# Patient Record
Sex: FEMALE | Race: WHITE | NOT HISPANIC OR LATINO | Employment: UNEMPLOYED | URBAN - NONMETROPOLITAN AREA
[De-identification: names, ages, dates, MRNs, and addresses within clinical notes are randomized per-mention and may not be internally consistent; named-entity substitution may affect disease eponyms.]

---

## 2017-02-14 DIAGNOSIS — I10 ESSENTIAL HYPERTENSION: ICD-10-CM

## 2017-02-14 RX ORDER — LISINOPRIL 2.5 MG/1
TABLET ORAL
Qty: 30 TABLET | Refills: 5 | Status: SHIPPED | OUTPATIENT
Start: 2017-02-14

## 2018-06-25 ENCOUNTER — TELEPHONE (OUTPATIENT)
Dept: VASCULAR SURGERY | Facility: CLINIC | Age: 53
End: 2018-06-25

## 2018-06-28 ENCOUNTER — OFFICE VISIT (OUTPATIENT)
Dept: VASCULAR SURGERY | Facility: CLINIC | Age: 53
End: 2018-06-28

## 2018-06-28 VITALS
SYSTOLIC BLOOD PRESSURE: 132 MMHG | DIASTOLIC BLOOD PRESSURE: 77 MMHG | OXYGEN SATURATION: 94 % | WEIGHT: 112 LBS | BODY MASS INDEX: 19.84 KG/M2 | HEART RATE: 76 BPM | HEIGHT: 63 IN

## 2018-06-28 DIAGNOSIS — I10 ESSENTIAL HYPERTENSION: ICD-10-CM

## 2018-06-28 DIAGNOSIS — Z72.0 TOBACCO ABUSE: ICD-10-CM

## 2018-06-28 DIAGNOSIS — I65.23 BILATERAL CAROTID ARTERY STENOSIS: Primary | ICD-10-CM

## 2018-06-28 PROCEDURE — 99203 OFFICE O/P NEW LOW 30 MIN: CPT | Performed by: NURSE PRACTITIONER

## 2018-06-28 RX ORDER — LISINOPRIL 2.5 MG/1
2.5 TABLET ORAL DAILY
Status: ON HOLD | COMMUNITY
End: 2019-06-30 | Stop reason: SDDI

## 2018-06-28 RX ORDER — ASPIRIN 325 MG
325 TABLET ORAL DAILY
COMMUNITY

## 2018-06-28 NOTE — PROGRESS NOTES
06/28/2018      Hernan Ybarra MD  [address] [facility] [address] [place] [address]    Jud Dhillon  1965    Chief Complaint   Patient presents with   • Establish Care     Patient is here to establish care. Pain in neck, dizziness and noise in ears. Patient denies any stroke like symptoms       Dear Hernan Ybarra MD:      HPI  I had the pleasure of seeing your patient Jud Dhillon in the office today.  Thank you kindly for this consultation.  As you recall, Jud Dhillon is a 53 y.o.  female who you are currently following for routine health maintenance.  She has complaints of neck pain, dizziness, and noise in her ears.  She denies any strokelike symptoms.  She did have noninvasive testing performed, which I did review in office.    Past Medical History:   Diagnosis Date   • Asthma    • Carotid artery disease    • COPD (chronic obstructive pulmonary disease)    • Dizziness    • Hypertension    • Other kyphosis, thoracic region    • Pneumonia    • Small bowel carcinoma     History of small bowel carcinoma and thyroid cancer   • Tinnitus    • Tobacco abuse        Past Surgical History:   Procedure Laterality Date   • APPENDECTOMY     • CHOLECYSTECTOMY         Family History   Problem Relation Age of Onset   • Cancer Mother         Breast Cancer, Liver Cancer   • Cancer Father         Lung Cancer, Brain Mets   • Cancer Maternal Uncle         Pancreatic Cancer   • Cancer Paternal Uncle         Lung Cancer   • Heart attack Maternal Grandmother    • Cancer Paternal Grandfather         Throat Cancer   • Stroke Neg Hx    • Aneurysm Neg Hx        Social History     Social History   • Marital status:      Spouse name: N/A   • Number of children: N/A   • Years of education: N/A     Occupational History   • Not on file.     Social History Main Topics   • Smoking status: Current Every Day Smoker     Packs/day: 1.50     Years: 45.00   • Smokeless tobacco: Never Used   • Alcohol use No   • Drug use: No   • Sexual activity:  Defer     Other Topics Concern   • Not on file     Social History Narrative   • No narrative on file       Allergies   Allergen Reactions   • Avelox [Moxifloxacin Hcl] Unknown (See Comments)     unknown   • Codeine Unknown (See Comments)     unknown   • Lortab [Hydrocodone-Acetaminophen] Unknown (See Comments)     Unknown   • Robaxin [Methocarbamol] Unknown (See Comments)     unknown       Prior to Admission medications    Medication Sig Start Date End Date Taking? Authorizing Provider   albuterol (PROVENTIL) (2.5 MG/3ML) 0.083% nebulizer solution  6/14/18  Yes Historical Provider, MD   aspirin 81 MG tablet Take 81 mg by mouth.   Yes Historical Provider, MD   atorvastatin (LIPITOR) 40 MG tablet  6/14/18  Yes Historical Provider, MD   baclofen (LIORESAL) 10 MG tablet  6/14/18  Yes Historical Provider, MD   escitalopram (LEXAPRO) 20 MG tablet  6/14/18  Yes Historical Provider, MD   furosemide (LASIX) 20 MG tablet  6/14/18  Yes Historical Provider, MD   lisinopril (PRINIVIL,ZESTRIL) 2.5 MG tablet Take 2.5 mg by mouth Daily.   Yes Historical Provider, MD   meclizine (ANTIVERT) 25 MG tablet  6/14/18  Yes Historical Provider, MD   metoprolol succinate XL (TOPROL-XL) 25 MG 24 hr tablet  6/14/18  Yes Historical Provider, MD   Multiple Vitamins-Minerals (SENTRY PO)  6/14/18  Yes Historical Provider, MD   ondansetron ODT (ZOFRAN-ODT) 4 MG disintegrating tablet  5/14/18  Yes Historical Provider, MD   promethazine (PHENERGAN) 25 MG tablet  6/18/18  Yes Historical Provider, MD   QUEtiapine (SEROquel) 50 MG tablet  6/14/18  Yes Historical Provider, MD   raNITIdine (ZANTAC) 150 MG tablet  6/14/18  Yes Historical Provider, MD   VENTOLIN  (90 Base) MCG/ACT inhaler  6/14/18  Yes Historical Provider, MD   triamterene-hydrochlorothiazide (MAXZIDE-25) 37.5-25 MG per tablet  6/14/18 6/28/18  Historical Provider, MD       Review of Systems   Constitutional: Negative.    HENT: Negative.    Eyes: Negative.    Respiratory: Negative.   "  Cardiovascular: Negative.    Gastrointestinal: Negative.    Endocrine: Negative.    Genitourinary: Negative.    Musculoskeletal: Positive for neck pain.   Skin: Negative.    Allergic/Immunologic: Negative.    Neurological: Positive for dizziness.   Hematological: Negative.    Psychiatric/Behavioral: Negative.        /77 (BP Location: Right arm, Patient Position: Sitting, Cuff Size: Adult)   Pulse 76   Ht 160 cm (63\")   Wt 50.8 kg (112 lb)   SpO2 94%   Breastfeeding? No   BMI 19.84 kg/m²   Physical Exam   Constitutional: She is oriented to person, place, and time. She appears well-developed and well-nourished. No distress.   HENT:   Head: Normocephalic and atraumatic.   Mouth/Throat: Oropharynx is clear and moist.   Eyes: Pupils are equal, round, and reactive to light. No scleral icterus.   Neck: Neck supple. No JVD present. Carotid bruit is not present. Decreased range of motion present. No thyromegaly present.   kyphosis   Cardiovascular: Normal rate, regular rhythm, S2 normal, normal heart sounds, intact distal pulses and normal pulses.  Exam reveals no gallop and no friction rub.    No murmur heard.  Pulmonary/Chest: Effort normal and breath sounds normal.   Abdominal: Soft. Normal aorta and bowel sounds are normal. There is no hepatosplenomegaly.   Neurological: She is alert and oriented to person, place, and time. No cranial nerve deficit.   Skin: Skin is warm and dry. She is not diaphoretic.   Psychiatric: She has a normal mood and affect. Her behavior is normal. Judgment and thought content normal.   Nursing note and vitals reviewed.      No results found.    Patient Active Problem List   Diagnosis   • CAD (coronary artery disease)   • Tobacco abuse   • Hypertension   • Other kyphosis, thoracic region   • Dizziness         ICD-10-CM ICD-9-CM   1. Bilateral carotid artery stenosis I65.23 433.10     433.30   2. Essential hypertension I10 401.9   3. Tobacco abuse Z72.0 305.1       Plan: After " thoroughly evaluating Jud Dhillon, I believe the best course of action is to remain conservative from a vascular standpoint.  We will see Jud Dhillon back in 1 year with repeat noninvasive testing for continued surveillance, including a carotid duplex.  She does not have any significant stenosis, but tortuosity of vessels per report.  I did discuss vascular risk factors as they pertain to the progression of vascular disease including controlling her hypertension and smoking cessation.  I advised Jud of the risks of continuing to use tobacco, and I provided her with tobacco cessation educational materials in the After Visit Summary. During this visit, I spent 10 minutes counseling the patient regarding tobacco cessation. The patient can continue taking her current medication regimen as previously planned.  This was all discussed in full with complete understanding.    Thank you for allowing me to participate in the care of your patient.  Please do not hesitate with any questions or concerns.  I will keep you aware of any further encounters with Jud Dhillon.        Sincerely yours,         RONAN Lee MD

## 2018-10-17 ENCOUNTER — APPOINTMENT (OUTPATIENT)
Dept: GENERAL RADIOLOGY | Age: 53
End: 2018-10-17
Payer: MEDICAID

## 2018-10-17 ENCOUNTER — HOSPITAL ENCOUNTER (EMERGENCY)
Age: 53
Discharge: HOME | End: 2018-10-17
Payer: MEDICAID

## 2018-10-17 VITALS
HEART RATE: 65 BPM | WEIGHT: 110 LBS | OXYGEN SATURATION: 96 % | TEMPERATURE: 98.4 F | DIASTOLIC BLOOD PRESSURE: 73 MMHG | SYSTOLIC BLOOD PRESSURE: 119 MMHG | BODY MASS INDEX: 19.49 KG/M2 | HEIGHT: 63 IN | RESPIRATION RATE: 16 BRPM

## 2018-10-17 DIAGNOSIS — S52.502D CLOSED FRACTURE OF DISTAL ENDS OF LEFT RADIUS AND ULNA WITH ROUTINE HEALING, SUBSEQUENT ENCOUNTER: Primary | ICD-10-CM

## 2018-10-17 DIAGNOSIS — S52.602D CLOSED FRACTURE OF DISTAL ENDS OF LEFT RADIUS AND ULNA WITH ROUTINE HEALING, SUBSEQUENT ENCOUNTER: Primary | ICD-10-CM

## 2018-10-17 PROCEDURE — 6370000000 HC RX 637 (ALT 250 FOR IP): Performed by: NURSE PRACTITIONER

## 2018-10-17 PROCEDURE — 73110 X-RAY EXAM OF WRIST: CPT

## 2018-10-17 PROCEDURE — 99283 EMERGENCY DEPT VISIT LOW MDM: CPT

## 2018-10-17 PROCEDURE — 73080 X-RAY EXAM OF ELBOW: CPT

## 2018-10-17 PROCEDURE — 99284 EMERGENCY DEPT VISIT MOD MDM: CPT | Performed by: NURSE PRACTITIONER

## 2018-10-17 PROCEDURE — 73090 X-RAY EXAM OF FOREARM: CPT

## 2018-10-17 RX ORDER — OXYCODONE HYDROCHLORIDE AND ACETAMINOPHEN 5; 325 MG/1; MG/1
1 TABLET ORAL ONCE
Status: COMPLETED | OUTPATIENT
Start: 2018-10-17 | End: 2018-10-17

## 2018-10-17 RX ORDER — IBUPROFEN 800 MG/1
800 TABLET ORAL EVERY 8 HOURS PRN
COMMUNITY

## 2018-10-17 RX ADMIN — OXYCODONE AND ACETAMINOPHEN 1 TABLET: 5; 325 TABLET ORAL at 13:54

## 2018-10-17 ASSESSMENT — PAIN SCALES - GENERAL
PAINLEVEL_OUTOF10: 7
PAINLEVEL_OUTOF10: 7

## 2018-10-17 ASSESSMENT — PAIN DESCRIPTION - ORIENTATION: ORIENTATION: LEFT

## 2018-10-17 NOTE — ED NOTES
Bed: RME03  Expected date:   Expected time:   Means of arrival:   Comments:     Aris Enamorado, [address] [facility]  10/17/18 1127

## 2018-10-18 ENCOUNTER — ANESTHESIA EVENT (OUTPATIENT)
Dept: OPERATING ROOM | Age: 53
End: 2018-10-18
Payer: MEDICAID

## 2018-10-18 ENCOUNTER — HOSPITAL ENCOUNTER (OUTPATIENT)
Age: 53
Setting detail: OUTPATIENT SURGERY
Discharge: HOME | End: 2018-10-18
Attending: ORTHOPAEDIC SURGERY | Admitting: ORTHOPAEDIC SURGERY
Payer: MEDICAID

## 2018-10-18 ENCOUNTER — APPOINTMENT (OUTPATIENT)
Dept: GENERAL RADIOLOGY | Age: 53
End: 2018-10-18
Attending: ORTHOPAEDIC SURGERY
Payer: MEDICAID

## 2018-10-18 ENCOUNTER — ANESTHESIA (OUTPATIENT)
Dept: OPERATING ROOM | Age: 53
End: 2018-10-18
Payer: MEDICAID

## 2018-10-18 VITALS — SYSTOLIC BLOOD PRESSURE: 94 MMHG | DIASTOLIC BLOOD PRESSURE: 52 MMHG | OXYGEN SATURATION: 100 %

## 2018-10-18 VITALS
HEIGHT: 63 IN | DIASTOLIC BLOOD PRESSURE: 76 MMHG | WEIGHT: 110 LBS | RESPIRATION RATE: 14 BRPM | TEMPERATURE: 98.4 F | HEART RATE: 81 BPM | BODY MASS INDEX: 19.49 KG/M2 | SYSTOLIC BLOOD PRESSURE: 141 MMHG | OXYGEN SATURATION: 95 %

## 2018-10-18 DIAGNOSIS — S52.532A CLOSED COLLES' FRACTURE OF LEFT RADIUS, INITIAL ENCOUNTER: Primary | ICD-10-CM

## 2018-10-18 LAB
ANION GAP SERPL CALCULATED.3IONS-SCNC: 13 MMOL/L (ref 7–19)
BUN BLDV-MCNC: 17 MG/DL (ref 6–20)
CALCIUM SERPL-MCNC: 8.6 MG/DL (ref 8.6–10)
CHLORIDE BLD-SCNC: 106 MMOL/L (ref 98–111)
CO2: 22 MMOL/L (ref 22–29)
CREAT SERPL-MCNC: 0.5 MG/DL (ref 0.5–0.9)
GFR NON-AFRICAN AMERICAN: >60
GLUCOSE BLD-MCNC: 92 MG/DL (ref 74–109)
HCT VFR BLD CALC: 35 % (ref 37–47)
HEMOGLOBIN: 10.5 G/DL (ref 12–16)
MCH RBC QN AUTO: 26.3 PG (ref 27–31)
MCHC RBC AUTO-ENTMCNC: 30 G/DL (ref 33–37)
MCV RBC AUTO: 87.7 FL (ref 81–99)
PDW BLD-RTO: 17.6 % (ref 11.5–14.5)
PLATELET # BLD: 278 K/UL (ref 130–400)
PMV BLD AUTO: 10.3 FL (ref 9.4–12.3)
POTASSIUM SERPL-SCNC: 4.4 MMOL/L (ref 3.5–4.9)
RBC # BLD: 3.99 M/UL (ref 4.2–5.4)
SODIUM BLD-SCNC: 141 MMOL/L (ref 136–145)
WBC # BLD: 15.2 K/UL (ref 4.8–10.8)

## 2018-10-18 PROCEDURE — 2580000003 HC RX 258: Performed by: ANESTHESIOLOGY

## 2018-10-18 PROCEDURE — 2709999900 HC NON-CHARGEABLE SUPPLY: Performed by: ORTHOPAEDIC SURGERY

## 2018-10-18 PROCEDURE — 2500000003 HC RX 250 WO HCPCS: Performed by: ANESTHESIOLOGY

## 2018-10-18 PROCEDURE — 6360000002 HC RX W HCPCS: Performed by: ANESTHESIOLOGY

## 2018-10-18 PROCEDURE — 7100000011 HC PHASE II RECOVERY - ADDTL 15 MIN: Performed by: ORTHOPAEDIC SURGERY

## 2018-10-18 PROCEDURE — 64415 NJX AA&/STRD BRCH PLXS IMG: CPT | Performed by: NURSE ANESTHETIST, CERTIFIED REGISTERED

## 2018-10-18 PROCEDURE — 3600000014 HC SURGERY LEVEL 4 ADDTL 15MIN: Performed by: ORTHOPAEDIC SURGERY

## 2018-10-18 PROCEDURE — 93005 ELECTROCARDIOGRAM TRACING: CPT

## 2018-10-18 PROCEDURE — 73100 X-RAY EXAM OF WRIST: CPT

## 2018-10-18 PROCEDURE — C1713 ANCHOR/SCREW BN/BN,TIS/BN: HCPCS | Performed by: ORTHOPAEDIC SURGERY

## 2018-10-18 PROCEDURE — 3209999900 FLUORO FOR SURGICAL PROCEDURES

## 2018-10-18 PROCEDURE — 3700000000 HC ANESTHESIA ATTENDED CARE: Performed by: ORTHOPAEDIC SURGERY

## 2018-10-18 PROCEDURE — 6360000002 HC RX W HCPCS: Performed by: NURSE ANESTHETIST, CERTIFIED REGISTERED

## 2018-10-18 PROCEDURE — 80048 BASIC METABOLIC PNL TOTAL CA: CPT

## 2018-10-18 PROCEDURE — 3700000001 HC ADD 15 MINUTES (ANESTHESIA): Performed by: ORTHOPAEDIC SURGERY

## 2018-10-18 PROCEDURE — 7100000001 HC PACU RECOVERY - ADDTL 15 MIN: Performed by: ORTHOPAEDIC SURGERY

## 2018-10-18 PROCEDURE — 85027 COMPLETE CBC AUTOMATED: CPT

## 2018-10-18 PROCEDURE — 2720000010 HC SURG SUPPLY STERILE: Performed by: ORTHOPAEDIC SURGERY

## 2018-10-18 PROCEDURE — 3600000004 HC SURGERY LEVEL 4 BASE: Performed by: ORTHOPAEDIC SURGERY

## 2018-10-18 PROCEDURE — 2580000003 HC RX 258: Performed by: ORTHOPAEDIC SURGERY

## 2018-10-18 PROCEDURE — 6360000002 HC RX W HCPCS: Performed by: ORTHOPAEDIC SURGERY

## 2018-10-18 PROCEDURE — 7100000000 HC PACU RECOVERY - FIRST 15 MIN: Performed by: ORTHOPAEDIC SURGERY

## 2018-10-18 PROCEDURE — 36415 COLL VENOUS BLD VENIPUNCTURE: CPT

## 2018-10-18 PROCEDURE — 7100000010 HC PHASE II RECOVERY - FIRST 15 MIN: Performed by: ORTHOPAEDIC SURGERY

## 2018-10-18 DEVICE — SCREW BNE L16MM DIA2.7MM DST VOLAR RAD NONLOCKING FULL THRD: Type: IMPLANTABLE DEVICE | Site: WRIST | Status: FUNCTIONAL

## 2018-10-18 DEVICE — SCREW BNE FIX L18MM DIA2.2MM DST VOLAR RAD SMOOTH LOK PEG: Type: IMPLANTABLE DEVICE | Site: WRIST | Status: FUNCTIONAL

## 2018-10-18 DEVICE — PEG BONE FXTN L20MM D2.2MM DST VOLAR RDL SMOOTH LOK CRSSLCK: Type: IMPLANTABLE DEVICE | Site: WRIST | Status: FUNCTIONAL

## 2018-10-18 DEVICE — SCREW BNE L16MM DIA2.7MM DST RAD LOK FULL THRD SQ DRV HD LO: Type: IMPLANTABLE DEVICE | Site: WRIST | Status: FUNCTIONAL

## 2018-10-18 DEVICE — SCREW BONE L15MM DIA2.7MM DSTL RAD LCK FULL THRD SQ DRV HD: Type: IMPLANTABLE DEVICE | Site: WRIST | Status: FUNCTIONAL

## 2018-10-18 DEVICE — PLATE BNE W24XL51MM 12 H L DST RAD TI LOK COMPR LO PROF NAR: Type: IMPLANTABLE DEVICE | Site: WRIST | Status: FUNCTIONAL

## 2018-10-18 RX ORDER — ENALAPRILAT 2.5 MG/2ML
1.25 INJECTION INTRAVENOUS
Status: DISCONTINUED | OUTPATIENT
Start: 2018-10-18 | End: 2018-10-18 | Stop reason: HOSPADM

## 2018-10-18 RX ORDER — ROPIVACAINE HYDROCHLORIDE 5 MG/ML
INJECTION, SOLUTION EPIDURAL; INFILTRATION; PERINEURAL PRN
Status: DISCONTINUED | OUTPATIENT
Start: 2018-10-18 | End: 2018-10-18 | Stop reason: SDUPTHER

## 2018-10-18 RX ORDER — PROPOFOL 10 MG/ML
INJECTION, EMULSION INTRAVENOUS CONTINUOUS PRN
Status: DISCONTINUED | OUTPATIENT
Start: 2018-10-18 | End: 2018-10-18 | Stop reason: SDUPTHER

## 2018-10-18 RX ORDER — MEPERIDINE HYDROCHLORIDE 50 MG/ML
12.5 INJECTION INTRAMUSCULAR; INTRAVENOUS; SUBCUTANEOUS EVERY 5 MIN PRN
Status: DISCONTINUED | OUTPATIENT
Start: 2018-10-18 | End: 2018-10-18 | Stop reason: HOSPADM

## 2018-10-18 RX ORDER — HYDRALAZINE HYDROCHLORIDE 20 MG/ML
5 INJECTION INTRAMUSCULAR; INTRAVENOUS EVERY 10 MIN PRN
Status: DISCONTINUED | OUTPATIENT
Start: 2018-10-18 | End: 2018-10-18 | Stop reason: HOSPADM

## 2018-10-18 RX ORDER — SODIUM CHLORIDE, SODIUM LACTATE, POTASSIUM CHLORIDE, CALCIUM CHLORIDE 600; 310; 30; 20 MG/100ML; MG/100ML; MG/100ML; MG/100ML
INJECTION, SOLUTION INTRAVENOUS CONTINUOUS
Status: DISCONTINUED | OUTPATIENT
Start: 2018-10-18 | End: 2018-10-18 | Stop reason: HOSPADM

## 2018-10-18 RX ORDER — FENTANYL CITRATE 50 UG/ML
25 INJECTION, SOLUTION INTRAMUSCULAR; INTRAVENOUS
Status: DISCONTINUED | OUTPATIENT
Start: 2018-10-18 | End: 2018-10-18 | Stop reason: HOSPADM

## 2018-10-18 RX ORDER — SODIUM CHLORIDE 0.9 % (FLUSH) 0.9 %
10 SYRINGE (ML) INJECTION PRN
Status: DISCONTINUED | OUTPATIENT
Start: 2018-10-18 | End: 2018-10-18 | Stop reason: HOSPADM

## 2018-10-18 RX ORDER — METOCLOPRAMIDE HYDROCHLORIDE 5 MG/ML
10 INJECTION INTRAMUSCULAR; INTRAVENOUS
Status: DISCONTINUED | OUTPATIENT
Start: 2018-10-18 | End: 2018-10-18 | Stop reason: HOSPADM

## 2018-10-18 RX ORDER — FENTANYL CITRATE 50 UG/ML
50 INJECTION, SOLUTION INTRAMUSCULAR; INTRAVENOUS
Status: DISCONTINUED | OUTPATIENT
Start: 2018-10-18 | End: 2018-10-18 | Stop reason: HOSPADM

## 2018-10-18 RX ORDER — LABETALOL HYDROCHLORIDE 5 MG/ML
5 INJECTION, SOLUTION INTRAVENOUS EVERY 10 MIN PRN
Status: DISCONTINUED | OUTPATIENT
Start: 2018-10-18 | End: 2018-10-18 | Stop reason: HOSPADM

## 2018-10-18 RX ORDER — OXYCODONE HYDROCHLORIDE AND ACETAMINOPHEN 5; 325 MG/1; MG/1
1 TABLET ORAL EVERY 4 HOURS PRN
Qty: 40 TABLET | Refills: 0 | Status: SHIPPED | OUTPATIENT
Start: 2018-10-18 | End: 2018-10-21

## 2018-10-18 RX ORDER — SODIUM CHLORIDE 0.9 % (FLUSH) 0.9 %
10 SYRINGE (ML) INJECTION EVERY 12 HOURS SCHEDULED
Status: DISCONTINUED | OUTPATIENT
Start: 2018-10-18 | End: 2018-10-18 | Stop reason: HOSPADM

## 2018-10-18 RX ORDER — SODIUM CHLORIDE 9 MG/ML
INJECTION, SOLUTION INTRAVENOUS CONTINUOUS
Status: DISCONTINUED | OUTPATIENT
Start: 2018-10-18 | End: 2018-10-18 | Stop reason: HOSPADM

## 2018-10-18 RX ORDER — ASPIRIN 325 MG
325 TABLET ORAL DAILY
COMMUNITY

## 2018-10-18 RX ORDER — LIDOCAINE HYDROCHLORIDE 20 MG/ML
INJECTION, SOLUTION INFILTRATION; PERINEURAL PRN
Status: DISCONTINUED | OUTPATIENT
Start: 2018-10-18 | End: 2018-10-18 | Stop reason: SDUPTHER

## 2018-10-18 RX ORDER — MORPHINE SULFATE/0.9% NACL/PF 1 MG/ML
2 SYRINGE (ML) INJECTION EVERY 5 MIN PRN
Status: DISCONTINUED | OUTPATIENT
Start: 2018-10-18 | End: 2018-10-18 | Stop reason: HOSPADM

## 2018-10-18 RX ORDER — MIDAZOLAM HYDROCHLORIDE 1 MG/ML
INJECTION INTRAMUSCULAR; INTRAVENOUS PRN
Status: DISCONTINUED | OUTPATIENT
Start: 2018-10-18 | End: 2018-10-18 | Stop reason: SDUPTHER

## 2018-10-18 RX ORDER — MIDAZOLAM HYDROCHLORIDE 1 MG/ML
2 INJECTION INTRAMUSCULAR; INTRAVENOUS
Status: COMPLETED | OUTPATIENT
Start: 2018-10-18 | End: 2018-10-18

## 2018-10-18 RX ORDER — LIDOCAINE HYDROCHLORIDE 10 MG/ML
1 INJECTION, SOLUTION EPIDURAL; INFILTRATION; INTRACAUDAL; PERINEURAL
Status: DISCONTINUED | OUTPATIENT
Start: 2018-10-18 | End: 2018-10-18 | Stop reason: HOSPADM

## 2018-10-18 RX ORDER — DIPHENHYDRAMINE HYDROCHLORIDE 50 MG/ML
12.5 INJECTION INTRAMUSCULAR; INTRAVENOUS
Status: DISCONTINUED | OUTPATIENT
Start: 2018-10-18 | End: 2018-10-18 | Stop reason: HOSPADM

## 2018-10-18 RX ORDER — MORPHINE SULFATE/0.9% NACL/PF 1 MG/ML
4 SYRINGE (ML) INJECTION EVERY 5 MIN PRN
Status: DISCONTINUED | OUTPATIENT
Start: 2018-10-18 | End: 2018-10-18 | Stop reason: HOSPADM

## 2018-10-18 RX ADMIN — LIDOCAINE HYDROCHLORIDE 15 ML: 20 INJECTION, SOLUTION INFILTRATION; PERINEURAL at 16:30

## 2018-10-18 RX ADMIN — SODIUM CHLORIDE, SODIUM LACTATE, POTASSIUM CHLORIDE, AND CALCIUM CHLORIDE: 600; 310; 30; 20 INJECTION, SOLUTION INTRAVENOUS at 16:33

## 2018-10-18 RX ADMIN — MIDAZOLAM 2 MG: 1 INJECTION INTRAMUSCULAR; INTRAVENOUS at 16:47

## 2018-10-18 RX ADMIN — ROPIVACAINE HYDROCHLORIDE 15 ML: 5 INJECTION, SOLUTION EPIDURAL; INFILTRATION; PERINEURAL at 16:30

## 2018-10-18 RX ADMIN — MIDAZOLAM HYDROCHLORIDE 1 MG: 2 INJECTION, SOLUTION INTRAMUSCULAR; INTRAVENOUS at 16:23

## 2018-10-18 RX ADMIN — SODIUM CHLORIDE: 9 INJECTION, SOLUTION INTRAVENOUS at 17:33

## 2018-10-18 RX ADMIN — PROPOFOL 50 MCG/KG/MIN: 10 INJECTION, EMULSION INTRAVENOUS at 16:42

## 2018-10-18 RX ADMIN — Medication 2 G: at 16:50

## 2018-10-18 ASSESSMENT — ENCOUNTER SYMPTOMS: SHORTNESS OF BREATH: 0

## 2018-10-18 ASSESSMENT — PAIN SCALES - GENERAL
PAINLEVEL_OUTOF10: 0

## 2018-10-18 ASSESSMENT — PAIN DESCRIPTION - DESCRIPTORS: DESCRIPTORS: THROBBING

## 2018-10-18 ASSESSMENT — PAIN - FUNCTIONAL ASSESSMENT: PAIN_FUNCTIONAL_ASSESSMENT: 0-10

## 2018-10-18 ASSESSMENT — LIFESTYLE VARIABLES: SMOKING_STATUS: 1

## 2018-10-18 NOTE — H&P
changes. PHYSICAL EXAM:      Physical Examination:  Vitals:   Vitals:    10/18/18 1354   BP: 105/69   Pulse: 94   Resp: 14   Temp: 98.9 °F (37.2 °C)   TempSrc: Tympanic   SpO2: 99%   Weight: 110 lb (49.9 kg)   Height: 5' 3\" (1.6 m)     General:  Appears stated age, no distress. Ill appearing. Smoky voice. Very thin  Orientation:  Alert and oriented to time, place, and person. Mood and Affect:  Cooperative and pleasant. Gait:  Resting comfortably in bed. Cardiovascular:  Symmetric 1-2 plus pulses in upper and lower extremities. Lymph:  No cervical or inguinal lymphadenopathy noted. Sensation:  Grossly intact to light touch. DTR:  Normal, no pathologic reflexes. Coordination/balance:  Normal    Musculoskeletal:  Right upper extremity exam:  There is no tenderness to palpation about the shoulder, elbow, wrist or hand. Range of motion normal  .  5/5 strength, normal sensation, good radial pulse and skin is normal.      Left upper extremity exam:  There is no tenderness to palpation about the shoulder, elbow, wrist or hand. In a forearm sugar tong splint. Fingers pink with 2 sec crt. Range of motion normal .   5/5 strength, normal sensation, good radial pulse and skin is normal.     Right lower extremity exam:  There is no tenderness to palpation about the hip, knee, ankle or foot. Range of motion normal .  5/5 strength, normal sensation, good dorsalis pedis pulse  and skin is normal.     Left lower extremity exam:  There is no tenderness to palpation about the hip, knee, ankle or foot.  Range of motion normal .   5/5 strength normal sensation, good dorsalis pedis pulse and skin is normal.      DATA:    CBC with Differential:    Lab Results   Component Value Date    WBC 15.2 10/18/2018    RBC 3.99 10/18/2018    HGB 10.5 10/18/2018    HCT 35.0 10/18/2018     10/18/2018    MCV 87.7 10/18/2018    MCH 26.3 10/18/2018    MCHC 30.0 10/18/2018    RDW 17.6 10/18/2018    LYMPHOPCT 28.9 02/02/2015    MONOPCT 8.8 left wrist. Acute comminuted fracture of the distal left radial metaphysis. The distal fracture fragment is dorsally angulated. Linear/vertical fracture extends proximally through the distal radial shaft. No definite fracture extension to the radiocarpal joint. Mild comminuted extra-articular fracture also involves the distal ulna with dorsal angulation of the distal fracture fragment. Avulsion of the ulnar styloid is suspected. Bones are demineralized. Severe first carpometacarpal DJD. Acute displaced fractures of the distal left radius and ulna with dorsal angulation of both distal fracture fragments. Signed by Dr Eren Helm on 10/17/2018 12:19 PM      Assessment:   Left distal radius ulna fracture displaced. Plan:  Open reduction and internal fixation left wrist fracture. I explained to the patient/family the patient's diagnosis and operative procedure in detail. They said they understood basically what was wrong and how I planned to fix it. They understand the expected recovery and the risks which include excessive bleeding, infection, reaction to anesthesia, nerve injury, stiffness, fracture, deformity and dislocation. They then signed an operative consent form. Provider:  Susan Warren  Date: 10/18/2018

## 2018-10-19 LAB
EKG P AXIS: 72 DEGREES
EKG P-R INTERVAL: 156 MS
EKG Q-T INTERVAL: 382 MS
EKG QRS DURATION: 90 MS
EKG QTC CALCULATION (BAZETT): 424 MS
EKG T AXIS: 64 DEGREES

## 2019-06-10 ENCOUNTER — TELEPHONE (OUTPATIENT)
Dept: VASCULAR SURGERY | Facility: CLINIC | Age: 54
End: 2019-06-10

## 2019-06-10 NOTE — TELEPHONE ENCOUNTER
Tried calling to remind Ms Dhillon of her appointments for Tuesday, June 11th, 2019. Tried calling to remind [facility] and follow up afterwards at 930 am with Dr Langley.     When calling it states the number has been changed, disconnected, or no longer in service.

## 2019-06-11 ENCOUNTER — TELEPHONE (OUTPATIENT)
Dept: VASCULAR SURGERY | Facility: CLINIC | Age: 54
End: 2019-06-11

## 2019-06-30 ENCOUNTER — HOSPITAL ENCOUNTER (INPATIENT)
Facility: HOSPITAL | Age: 54
LOS: 2 days | Discharge: HOSPICE | End: 2019-07-02
Attending: FAMILY MEDICINE | Admitting: FAMILY MEDICINE

## 2019-06-30 ENCOUNTER — APPOINTMENT (OUTPATIENT)
Dept: GENERAL RADIOLOGY | Facility: HOSPITAL | Age: 54
End: 2019-06-30

## 2019-06-30 ENCOUNTER — APPOINTMENT (OUTPATIENT)
Dept: CARDIOLOGY | Facility: HOSPITAL | Age: 54
End: 2019-06-30

## 2019-06-30 DIAGNOSIS — R79.89 ELEVATED BRAIN NATRIURETIC PEPTIDE (BNP) LEVEL: ICD-10-CM

## 2019-06-30 DIAGNOSIS — K76.9 LIVER LESION: ICD-10-CM

## 2019-06-30 DIAGNOSIS — R09.02 HYPOXIA: ICD-10-CM

## 2019-06-30 DIAGNOSIS — K85.00 IDIOPATHIC ACUTE PANCREATITIS, UNSPECIFIED COMPLICATION STATUS: ICD-10-CM

## 2019-06-30 DIAGNOSIS — R62.7 FAILURE TO THRIVE IN ADULT: Primary | ICD-10-CM

## 2019-06-30 DIAGNOSIS — D64.9 ANEMIA, UNSPECIFIED TYPE: ICD-10-CM

## 2019-06-30 DIAGNOSIS — J18.9 PNEUMONIA OF RIGHT MIDDLE LOBE DUE TO INFECTIOUS ORGANISM: ICD-10-CM

## 2019-06-30 DIAGNOSIS — R77.8 ELEVATED TROPONIN: ICD-10-CM

## 2019-06-30 DIAGNOSIS — R13.10 DYSPHAGIA, UNSPECIFIED TYPE: ICD-10-CM

## 2019-06-30 DIAGNOSIS — R74.8 ELEVATED ALKALINE PHOSPHATASE LEVEL: ICD-10-CM

## 2019-06-30 LAB
ACANTHOCYTES BLD QL SMEAR: ABNORMAL
ALBUMIN SERPL-MCNC: 3.1 G/DL (ref 3.5–5)
ALBUMIN/GLOB SERPL: 1 G/DL (ref 1.1–2.5)
ALP SERPL-CCNC: 599 U/L (ref 24–120)
ALT SERPL W P-5'-P-CCNC: 32 U/L (ref 0–54)
AMMONIA BLD-SCNC: 13 UMOL/L (ref 9–33)
AMYLASE SERPL-CCNC: 147 U/L (ref 30–110)
ANION GAP SERPL CALCULATED.3IONS-SCNC: 7 MMOL/L (ref 4–13)
ANISOCYTOSIS BLD QL: ABNORMAL
APTT PPP: 35.8 SECONDS (ref 24.1–35)
ARTERIAL PATENCY WRIST A: ABNORMAL
AST SERPL-CCNC: 73 U/L (ref 7–45)
ATMOSPHERIC PRESS: 753 MMHG
BACTERIA UR QL AUTO: ABNORMAL /HPF
BASE EXCESS BLDA CALC-SCNC: 2.5 MMOL/L (ref 0–2)
BDY SITE: ABNORMAL
BH CV ECHO MEAS - AO MAX PG (FULL): 9.1 MMHG
BH CV ECHO MEAS - AO MAX PG: 15.4 MMHG
BH CV ECHO MEAS - AO MEAN PG (FULL): 5 MMHG
BH CV ECHO MEAS - AO MEAN PG: 8 MMHG
BH CV ECHO MEAS - AO ROOT AREA (BSA CORRECTED): 2.4
BH CV ECHO MEAS - AO ROOT AREA: 10.8 CM^2
BH CV ECHO MEAS - AO ROOT DIAM: 3.7 CM
BH CV ECHO MEAS - AO V2 MAX: 196 CM/SEC
BH CV ECHO MEAS - AO V2 MEAN: 130 CM/SEC
BH CV ECHO MEAS - AO V2 VTI: 33.7 CM
BH CV ECHO MEAS - AVA(I,A): 2.3 CM^2
BH CV ECHO MEAS - AVA(I,D): 2.3 CM^2
BH CV ECHO MEAS - AVA(V,A): 2.2 CM^2
BH CV ECHO MEAS - AVA(V,D): 2.2 CM^2
BH CV ECHO MEAS - BSA(HAYCOCK): 1.5 M^2
BH CV ECHO MEAS - BSA: 1.5 M^2
BH CV ECHO MEAS - BZI_BMI: 20.5 KILOGRAMS/M^2
BH CV ECHO MEAS - BZI_METRIC_HEIGHT: 160 CM
BH CV ECHO MEAS - BZI_METRIC_WEIGHT: 52.6 KG
BH CV ECHO MEAS - EDV(CUBED): 76.8 ML
BH CV ECHO MEAS - EDV(MOD-SP4): 125 ML
BH CV ECHO MEAS - EDV(TEICH): 80.8 ML
BH CV ECHO MEAS - EF(CUBED): 87.4 %
BH CV ECHO MEAS - EF(MOD-SP4): 65 %
BH CV ECHO MEAS - EF(TEICH): 81.5 %
BH CV ECHO MEAS - ESV(CUBED): 9.7 ML
BH CV ECHO MEAS - ESV(MOD-SP4): 43.8 ML
BH CV ECHO MEAS - ESV(TEICH): 14.9 ML
BH CV ECHO MEAS - FS: 49.9 %
BH CV ECHO MEAS - IVS/LVPW: 0.87
BH CV ECHO MEAS - IVSD: 0.79 CM
BH CV ECHO MEAS - LAT PEAK E' VEL: 9.7 CM/SEC
BH CV ECHO MEAS - LV DIASTOLIC VOL/BSA (35-75): 81.5 ML/M^2
BH CV ECHO MEAS - LV MASS(C)D: 111.2 GRAMS
BH CV ECHO MEAS - LV MASS(C)DI: 72.5 GRAMS/M^2
BH CV ECHO MEAS - LV MAX PG: 6.3 MMHG
BH CV ECHO MEAS - LV MEAN PG: 3 MMHG
BH CV ECHO MEAS - LV SYSTOLIC VOL/BSA (12-30): 28.5 ML/M^2
BH CV ECHO MEAS - LV V1 MAX: 125 CM/SEC
BH CV ECHO MEAS - LV V1 MEAN: 80.1 CM/SEC
BH CV ECHO MEAS - LV V1 VTI: 22.2 CM
BH CV ECHO MEAS - LVIDD: 4.3 CM
BH CV ECHO MEAS - LVIDS: 2.1 CM
BH CV ECHO MEAS - LVLD AP4: 9.1 CM
BH CV ECHO MEAS - LVLS AP4: 7.8 CM
BH CV ECHO MEAS - LVOT AREA (M): 3.5 CM^2
BH CV ECHO MEAS - LVOT AREA: 3.5 CM^2
BH CV ECHO MEAS - LVOT DIAM: 2.1 CM
BH CV ECHO MEAS - LVPWD: 0.91 CM
BH CV ECHO MEAS - MED PEAK E' VEL: 5.87 CM/SEC
BH CV ECHO MEAS - MV A MAX VEL: 119 CM/SEC
BH CV ECHO MEAS - MV DEC SLOPE: 283 CM/SEC^2
BH CV ECHO MEAS - MV DEC TIME: 0.27 SEC
BH CV ECHO MEAS - MV E MAX VEL: 76.6 CM/SEC
BH CV ECHO MEAS - MV E/A: 0.64
BH CV ECHO MEAS - RAP SYSTOLE: 5 MMHG
BH CV ECHO MEAS - RVSP: 52.1 MMHG
BH CV ECHO MEAS - SI(AO): 236.2 ML/M^2
BH CV ECHO MEAS - SI(CUBED): 43.7 ML/M^2
BH CV ECHO MEAS - SI(LVOT): 50.1 ML/M^2
BH CV ECHO MEAS - SI(MOD-SP4): 52.9 ML/M^2
BH CV ECHO MEAS - SI(TEICH): 42.9 ML/M^2
BH CV ECHO MEAS - SV(AO): 362.3 ML
BH CV ECHO MEAS - SV(CUBED): 67.1 ML
BH CV ECHO MEAS - SV(LVOT): 76.9 ML
BH CV ECHO MEAS - SV(MOD-SP4): 81.2 ML
BH CV ECHO MEAS - SV(TEICH): 65.9 ML
BH CV ECHO MEAS - TR MAX VEL: 343 CM/SEC
BH CV ECHO MEASUREMENTS AVERAGE E/E' RATIO: 9.84
BILIRUB SERPL-MCNC: 0.7 MG/DL (ref 0.1–1)
BILIRUB UR QL STRIP: ABNORMAL
BODY TEMPERATURE: 37 C
BUN BLD-MCNC: 8 MG/DL (ref 5–21)
BUN/CREAT SERPL: 22.9 (ref 7–25)
BURR CELLS BLD QL SMEAR: ABNORMAL
CALCIUM SPEC-SCNC: 8.3 MG/DL (ref 8.4–10.4)
CHLORIDE SERPL-SCNC: 98 MMOL/L (ref 98–110)
CLARITY UR: CLEAR
CO2 SERPL-SCNC: 29 MMOL/L (ref 24–31)
COLOR UR: ABNORMAL
CREAT BLD-MCNC: 0.35 MG/DL (ref 0.5–1.4)
D-LACTATE SERPL-SCNC: 1.2 MMOL/L (ref 0.5–2)
DEPRECATED RDW RBC AUTO: 57.4 FL (ref 40–54)
EOSINOPHIL # BLD MANUAL: 0.28 10*3/MM3 (ref 0–0.7)
EOSINOPHIL NFR BLD MANUAL: 4 % (ref 0–4)
ERYTHROCYTE [DISTWIDTH] IN BLOOD BY AUTOMATED COUNT: 20.9 % (ref 12–15)
FERRITIN SERPL-MCNC: 101 NG/ML (ref 11.1–264)
FOLATE SERPL-MCNC: 8.39 NG/ML (ref 4.78–24.2)
GAS FLOW AIRWAY: 2 LPM
GFR SERPL CREATININE-BSD FRML MDRD: >150 ML/MIN/1.73
GIANT PLATELETS: ABNORMAL
GLOBULIN UR ELPH-MCNC: 3 GM/DL
GLUCOSE BLD-MCNC: 82 MG/DL (ref 70–100)
GLUCOSE UR STRIP-MCNC: NEGATIVE MG/DL
HCO3 BLDA-SCNC: 27 MMOL/L (ref 20–26)
HCT VFR BLD AUTO: 30.5 % (ref 37–47)
HGB BLD-MCNC: 9.5 G/DL (ref 12–16)
HGB UR QL STRIP.AUTO: NEGATIVE
HOLD SPECIMEN: NORMAL
HYALINE CASTS UR QL AUTO: ABNORMAL /LPF
INR PPP: 1.05 (ref 0.91–1.09)
IRON 24H UR-MRATE: 67 MCG/DL (ref 42–180)
IRON SATN MFR SERPL: 30 % (ref 20–45)
KETONES UR QL STRIP: ABNORMAL
LEFT ATRIUM VOLUME INDEX: 28.8 ML/M2
LEFT ATRIUM VOLUME: 44.1 CM3
LEUKOCYTE ESTERASE UR QL STRIP.AUTO: NEGATIVE
LIPASE SERPL-CCNC: 762 U/L (ref 23–203)
LV EF 2D ECHO EST: 65 %
LYMPHOCYTES # BLD MANUAL: 0.9 10*3/MM3 (ref 0.72–4.86)
LYMPHOCYTES NFR BLD MANUAL: 13 % (ref 15–45)
LYMPHOCYTES NFR BLD MANUAL: 3 % (ref 4–12)
Lab: ABNORMAL
MAXIMAL PREDICTED HEART RATE: 164 BPM
MCH RBC QN AUTO: 24.1 PG (ref 28–32)
MCHC RBC AUTO-ENTMCNC: 31.1 G/DL (ref 33–36)
MCV RBC AUTO: 77.2 FL (ref 82–98)
METAMYELOCYTES NFR BLD MANUAL: 2 % (ref 0–0)
MICROCYTES BLD QL: ABNORMAL
MODALITY: ABNORMAL
MONOCYTES # BLD AUTO: 0.21 10*3/MM3 (ref 0.19–1.3)
NEUTROPHILS # BLD AUTO: 5.43 10*3/MM3 (ref 1.87–8.4)
NEUTROPHILS NFR BLD MANUAL: 74 % (ref 39–78)
NEUTS BAND NFR BLD MANUAL: 4 % (ref 0–10)
NITRITE UR QL STRIP: NEGATIVE
NRBC SPEC MANUAL: 1 /100 WBC (ref 0–0.2)
NT-PROBNP SERPL-MCNC: 2580 PG/ML (ref 0–900)
OVALOCYTES BLD QL SMEAR: ABNORMAL
PCO2 BLDA: 40.3 MM HG (ref 35–45)
PH BLDA: 7.43 PH UNITS (ref 7.35–7.45)
PH UR STRIP.AUTO: 6 [PH] (ref 5–8)
PLATELET # BLD AUTO: 139 10*3/MM3 (ref 130–400)
PMV BLD AUTO: 10.1 FL (ref 6–12)
PO2 BLDA: 79.9 MM HG (ref 83–108)
POIKILOCYTOSIS BLD QL SMEAR: ABNORMAL
POLYCHROMASIA BLD QL SMEAR: ABNORMAL
POTASSIUM BLD-SCNC: 3.7 MMOL/L (ref 3.5–5.3)
PROCALCITONIN SERPL-MCNC: 0.59 NG/ML
PROT SERPL-MCNC: 6.1 G/DL (ref 6.3–8.7)
PROT UR QL STRIP: ABNORMAL
PROTHROMBIN TIME: 14 SECONDS (ref 11.9–14.6)
RBC # BLD AUTO: 3.95 10*6/MM3 (ref 4.2–5.4)
RBC # UR: ABNORMAL /HPF
REF LAB TEST METHOD: ABNORMAL
SAO2 % BLDCOA: 94.4 % (ref 94–99)
SCHISTOCYTES BLD QL SMEAR: ABNORMAL
SODIUM BLD-SCNC: 134 MMOL/L (ref 135–145)
SP GR UR STRIP: 1.02 (ref 1–1.03)
SQUAMOUS #/AREA URNS HPF: ABNORMAL /HPF
STRESS TARGET HR: 139 BPM
TIBC SERPL-MCNC: 224 MCG/DL (ref 225–420)
TROPONIN I SERPL-MCNC: 0.03 NG/ML (ref 0–0.03)
TROPONIN I SERPL-MCNC: 0.04 NG/ML (ref 0–0.03)
TROPONIN I SERPL-MCNC: 0.05 NG/ML (ref 0–0.03)
UROBILINOGEN UR QL STRIP: ABNORMAL
VENTILATOR MODE: ABNORMAL
VIT B12 BLD-MCNC: 503 PG/ML (ref 239–931)
WBC MORPH BLD: NORMAL
WBC NRBC COR # BLD: 6.96 10*3/MM3 (ref 4.8–10.8)
WBC UR QL AUTO: ABNORMAL /HPF

## 2019-06-30 PROCEDURE — 82140 ASSAY OF AMMONIA: CPT | Performed by: PHYSICIAN ASSISTANT

## 2019-06-30 PROCEDURE — 93306 TTE W/DOPPLER COMPLETE: CPT | Performed by: INTERNAL MEDICINE

## 2019-06-30 PROCEDURE — 25010000002 FENTANYL CITRATE (PF) 100 MCG/2ML SOLUTION: Performed by: EMERGENCY MEDICINE

## 2019-06-30 PROCEDURE — 87040 BLOOD CULTURE FOR BACTERIA: CPT | Performed by: PHYSICIAN ASSISTANT

## 2019-06-30 PROCEDURE — 87086 URINE CULTURE/COLONY COUNT: CPT | Performed by: PHYSICIAN ASSISTANT

## 2019-06-30 PROCEDURE — 36415 COLL VENOUS BLD VENIPUNCTURE: CPT | Performed by: PHYSICIAN ASSISTANT

## 2019-06-30 PROCEDURE — 25010000002 VANCOMYCIN 1 G RECONSTITUTED SOLUTION: Performed by: PHYSICIAN ASSISTANT

## 2019-06-30 PROCEDURE — G0378 HOSPITAL OBSERVATION PER HR: HCPCS

## 2019-06-30 PROCEDURE — 25010000002 PIPERACILLIN SOD-TAZOBACTAM PER 1 G: Performed by: FAMILY MEDICINE

## 2019-06-30 PROCEDURE — 25010000002 METHYLPREDNISOLONE PER 125 MG: Performed by: PHYSICIAN ASSISTANT

## 2019-06-30 PROCEDURE — 82607 VITAMIN B-12: CPT | Performed by: INTERNAL MEDICINE

## 2019-06-30 PROCEDURE — 84145 PROCALCITONIN (PCT): CPT | Performed by: PHYSICIAN ASSISTANT

## 2019-06-30 PROCEDURE — 93005 ELECTROCARDIOGRAM TRACING: CPT | Performed by: PHYSICIAN ASSISTANT

## 2019-06-30 PROCEDURE — P9612 CATHETERIZE FOR URINE SPEC: HCPCS

## 2019-06-30 PROCEDURE — 93010 ELECTROCARDIOGRAM REPORT: CPT | Performed by: INTERNAL MEDICINE

## 2019-06-30 PROCEDURE — 94799 UNLISTED PULMONARY SVC/PX: CPT

## 2019-06-30 PROCEDURE — 74022 RADEX COMPL AQT ABD SERIES: CPT

## 2019-06-30 PROCEDURE — 94760 N-INVAS EAR/PLS OXIMETRY 1: CPT

## 2019-06-30 PROCEDURE — 99255 IP/OBS CONSLTJ NEW/EST HI 80: CPT | Performed by: INTERNAL MEDICINE

## 2019-06-30 PROCEDURE — 85007 BL SMEAR W/DIFF WBC COUNT: CPT | Performed by: INTERNAL MEDICINE

## 2019-06-30 PROCEDURE — 83605 ASSAY OF LACTIC ACID: CPT | Performed by: PHYSICIAN ASSISTANT

## 2019-06-30 PROCEDURE — 82803 BLOOD GASES ANY COMBINATION: CPT

## 2019-06-30 PROCEDURE — 83690 ASSAY OF LIPASE: CPT | Performed by: PHYSICIAN ASSISTANT

## 2019-06-30 PROCEDURE — 92610 EVALUATE SWALLOWING FUNCTION: CPT

## 2019-06-30 PROCEDURE — 25010000002 ENOXAPARIN PER 10 MG: Performed by: FAMILY MEDICINE

## 2019-06-30 PROCEDURE — 93306 TTE W/DOPPLER COMPLETE: CPT

## 2019-06-30 PROCEDURE — 83540 ASSAY OF IRON: CPT | Performed by: INTERNAL MEDICINE

## 2019-06-30 PROCEDURE — 84484 ASSAY OF TROPONIN QUANT: CPT | Performed by: PHYSICIAN ASSISTANT

## 2019-06-30 PROCEDURE — 85730 THROMBOPLASTIN TIME PARTIAL: CPT | Performed by: PHYSICIAN ASSISTANT

## 2019-06-30 PROCEDURE — 83550 IRON BINDING TEST: CPT | Performed by: INTERNAL MEDICINE

## 2019-06-30 PROCEDURE — 82728 ASSAY OF FERRITIN: CPT | Performed by: INTERNAL MEDICINE

## 2019-06-30 PROCEDURE — 99285 EMERGENCY DEPT VISIT HI MDM: CPT

## 2019-06-30 PROCEDURE — 84484 ASSAY OF TROPONIN QUANT: CPT | Performed by: FAMILY MEDICINE

## 2019-06-30 PROCEDURE — 82150 ASSAY OF AMYLASE: CPT | Performed by: PHYSICIAN ASSISTANT

## 2019-06-30 PROCEDURE — 85610 PROTHROMBIN TIME: CPT | Performed by: PHYSICIAN ASSISTANT

## 2019-06-30 PROCEDURE — 25010000002 PIPERACILLIN SOD-TAZOBACTAM PER 1 G: Performed by: PHYSICIAN ASSISTANT

## 2019-06-30 PROCEDURE — 85025 COMPLETE CBC W/AUTO DIFF WBC: CPT | Performed by: PHYSICIAN ASSISTANT

## 2019-06-30 PROCEDURE — 80053 COMPREHEN METABOLIC PANEL: CPT | Performed by: PHYSICIAN ASSISTANT

## 2019-06-30 PROCEDURE — 25010000002 PERFLUTREN 6.52 MG/ML SUSPENSION: Performed by: FAMILY MEDICINE

## 2019-06-30 PROCEDURE — 85060 BLOOD SMEAR INTERPRETATION: CPT | Performed by: INTERNAL MEDICINE

## 2019-06-30 PROCEDURE — 81001 URINALYSIS AUTO W/SCOPE: CPT | Performed by: PHYSICIAN ASSISTANT

## 2019-06-30 PROCEDURE — 83880 ASSAY OF NATRIURETIC PEPTIDE: CPT | Performed by: PHYSICIAN ASSISTANT

## 2019-06-30 PROCEDURE — 82746 ASSAY OF FOLIC ACID SERUM: CPT | Performed by: INTERNAL MEDICINE

## 2019-06-30 PROCEDURE — 36600 WITHDRAWAL OF ARTERIAL BLOOD: CPT

## 2019-06-30 PROCEDURE — 94640 AIRWAY INHALATION TREATMENT: CPT

## 2019-06-30 PROCEDURE — 25010000002 METHYLPREDNISOLONE PER 40 MG: Performed by: FAMILY MEDICINE

## 2019-06-30 RX ORDER — LEVOTHYROXINE SODIUM 0.03 MG/1
25 TABLET ORAL
Status: DISCONTINUED | OUTPATIENT
Start: 2019-07-01 | End: 2019-07-02 | Stop reason: HOSPADM

## 2019-06-30 RX ORDER — MORPHINE SULFATE 15 MG/1
15 TABLET, FILM COATED, EXTENDED RELEASE ORAL 2 TIMES DAILY PRN
Status: DISCONTINUED | OUTPATIENT
Start: 2019-06-30 | End: 2019-07-02 | Stop reason: HOSPADM

## 2019-06-30 RX ORDER — LEVOTHYROXINE SODIUM 0.2 MG/1
200 TABLET ORAL
Status: DISCONTINUED | OUTPATIENT
Start: 2019-07-01 | End: 2019-07-02 | Stop reason: HOSPADM

## 2019-06-30 RX ORDER — GUAIFENESIN 400 MG/1
400 TABLET ORAL 3 TIMES DAILY
COMMUNITY

## 2019-06-30 RX ORDER — METHYLPREDNISOLONE SODIUM SUCCINATE 40 MG/ML
40 INJECTION, POWDER, LYOPHILIZED, FOR SOLUTION INTRAMUSCULAR; INTRAVENOUS EVERY 6 HOURS
Status: DISCONTINUED | OUTPATIENT
Start: 2019-06-30 | End: 2019-07-02 | Stop reason: HOSPADM

## 2019-06-30 RX ORDER — METHYLPREDNISOLONE SODIUM SUCCINATE 125 MG/2ML
125 INJECTION, POWDER, LYOPHILIZED, FOR SOLUTION INTRAMUSCULAR; INTRAVENOUS ONCE
Status: COMPLETED | OUTPATIENT
Start: 2019-06-30 | End: 2019-06-30

## 2019-06-30 RX ORDER — SODIUM CHLORIDE, SODIUM LACTATE, POTASSIUM CHLORIDE, CALCIUM CHLORIDE 600; 310; 30; 20 MG/100ML; MG/100ML; MG/100ML; MG/100ML
75 INJECTION, SOLUTION INTRAVENOUS CONTINUOUS
Status: DISCONTINUED | OUTPATIENT
Start: 2019-06-30 | End: 2019-07-02 | Stop reason: HOSPADM

## 2019-06-30 RX ORDER — LEVOTHYROXINE SODIUM 0.2 MG/1
200 TABLET ORAL DAILY
COMMUNITY

## 2019-06-30 RX ORDER — SODIUM CHLORIDE 0.9 % (FLUSH) 0.9 %
3 SYRINGE (ML) INJECTION EVERY 12 HOURS SCHEDULED
Status: DISCONTINUED | OUTPATIENT
Start: 2019-06-30 | End: 2019-07-02 | Stop reason: HOSPADM

## 2019-06-30 RX ORDER — IBUPROFEN 800 MG/1
800 TABLET ORAL EVERY 8 HOURS PRN
COMMUNITY

## 2019-06-30 RX ORDER — FAMOTIDINE 10 MG/ML
20 INJECTION, SOLUTION INTRAVENOUS 2 TIMES DAILY
Status: DISCONTINUED | OUTPATIENT
Start: 2019-06-30 | End: 2019-07-02 | Stop reason: HOSPADM

## 2019-06-30 RX ORDER — SODIUM CHLORIDE 9 MG/ML
125 INJECTION, SOLUTION INTRAVENOUS CONTINUOUS
Status: DISCONTINUED | OUTPATIENT
Start: 2019-06-30 | End: 2019-06-30

## 2019-06-30 RX ORDER — MORPHINE SULFATE 30 MG/1
30 TABLET, FILM COATED, EXTENDED RELEASE ORAL 2 TIMES DAILY
COMMUNITY

## 2019-06-30 RX ORDER — ESCITALOPRAM OXALATE 10 MG/1
20 TABLET ORAL NIGHTLY
Status: DISCONTINUED | OUTPATIENT
Start: 2019-06-30 | End: 2019-07-02 | Stop reason: HOSPADM

## 2019-06-30 RX ORDER — LORATADINE 10 MG/1
10 TABLET ORAL DAILY
COMMUNITY

## 2019-06-30 RX ORDER — FENTANYL CITRATE 50 UG/ML
1 INJECTION, SOLUTION INTRAMUSCULAR; INTRAVENOUS ONCE
Status: COMPLETED | OUTPATIENT
Start: 2019-06-30 | End: 2019-06-30

## 2019-06-30 RX ORDER — ONDANSETRON 2 MG/ML
4 INJECTION INTRAMUSCULAR; INTRAVENOUS EVERY 6 HOURS PRN
Status: DISCONTINUED | OUTPATIENT
Start: 2019-06-30 | End: 2019-07-02 | Stop reason: HOSPADM

## 2019-06-30 RX ORDER — VANCOMYCIN HYDROCHLORIDE 1 G/200ML
1000 INJECTION, SOLUTION INTRAVENOUS EVERY 12 HOURS
Status: DISCONTINUED | OUTPATIENT
Start: 2019-06-30 | End: 2019-06-30 | Stop reason: SDUPTHER

## 2019-06-30 RX ORDER — ATORVASTATIN CALCIUM 40 MG/1
40 TABLET, FILM COATED ORAL NIGHTLY
Status: DISCONTINUED | OUTPATIENT
Start: 2019-06-30 | End: 2019-07-02 | Stop reason: HOSPADM

## 2019-06-30 RX ORDER — SODIUM CHLORIDE 0.9 % (FLUSH) 0.9 %
3-10 SYRINGE (ML) INJECTION AS NEEDED
Status: DISCONTINUED | OUTPATIENT
Start: 2019-06-30 | End: 2019-07-02 | Stop reason: HOSPADM

## 2019-06-30 RX ORDER — ALBUTEROL SULFATE 2.5 MG/3ML
2.5 SOLUTION RESPIRATORY (INHALATION) ONCE
Status: COMPLETED | OUTPATIENT
Start: 2019-06-30 | End: 2019-06-30

## 2019-06-30 RX ORDER — LEVOTHYROXINE SODIUM 0.03 MG/1
25 TABLET ORAL DAILY
COMMUNITY

## 2019-06-30 RX ORDER — ASPIRIN 81 MG/1
162 TABLET, CHEWABLE ORAL DAILY
Status: DISCONTINUED | OUTPATIENT
Start: 2019-06-30 | End: 2019-07-02 | Stop reason: HOSPADM

## 2019-06-30 RX ORDER — IPRATROPIUM BROMIDE AND ALBUTEROL SULFATE 2.5; .5 MG/3ML; MG/3ML
3 SOLUTION RESPIRATORY (INHALATION)
Status: DISCONTINUED | OUTPATIENT
Start: 2019-06-30 | End: 2019-07-02 | Stop reason: HOSPADM

## 2019-06-30 RX ORDER — GUAIFENESIN 600 MG/1
600 TABLET, EXTENDED RELEASE ORAL EVERY 12 HOURS SCHEDULED
Status: DISCONTINUED | OUTPATIENT
Start: 2019-06-30 | End: 2019-07-02 | Stop reason: HOSPADM

## 2019-06-30 RX ADMIN — FAMOTIDINE 20 MG: 10 INJECTION INTRAVENOUS at 20:39

## 2019-06-30 RX ADMIN — METHYLPREDNISOLONE SODIUM SUCCINATE 125 MG: 125 INJECTION, POWDER, FOR SOLUTION INTRAMUSCULAR; INTRAVENOUS at 08:54

## 2019-06-30 RX ADMIN — METHYLPREDNISOLONE SODIUM SUCCINATE 40 MG: 40 INJECTION, POWDER, FOR SOLUTION INTRAMUSCULAR; INTRAVENOUS at 16:32

## 2019-06-30 RX ADMIN — VANCOMYCIN HYDROCHLORIDE 1000 MG: 1 INJECTION, POWDER, LYOPHILIZED, FOR SOLUTION INTRAVENOUS at 09:42

## 2019-06-30 RX ADMIN — SODIUM CHLORIDE, POTASSIUM CHLORIDE, SODIUM LACTATE AND CALCIUM CHLORIDE 75 ML/HR: 600; 310; 30; 20 INJECTION, SOLUTION INTRAVENOUS at 16:32

## 2019-06-30 RX ADMIN — ALBUTEROL SULFATE 2.5 MG: 2.5 SOLUTION RESPIRATORY (INHALATION) at 09:12

## 2019-06-30 RX ADMIN — SODIUM CHLORIDE 125 ML/HR: 9 INJECTION, SOLUTION INTRAVENOUS at 11:02

## 2019-06-30 RX ADMIN — MORPHINE SULFATE 15 MG: 15 TABLET, FILM COATED, EXTENDED RELEASE ORAL at 16:55

## 2019-06-30 RX ADMIN — IPRATROPIUM BROMIDE AND ALBUTEROL SULFATE 3 ML: 2.5; .5 SOLUTION RESPIRATORY (INHALATION) at 19:20

## 2019-06-30 RX ADMIN — SODIUM CHLORIDE, PRESERVATIVE FREE 3 ML: 5 INJECTION INTRAVENOUS at 23:30

## 2019-06-30 RX ADMIN — ESCITALOPRAM 20 MG: 10 TABLET, FILM COATED ORAL at 20:39

## 2019-06-30 RX ADMIN — FENTANYL CITRATE 52.5 MCG: 50 INJECTION INTRAMUSCULAR; INTRAVENOUS at 11:00

## 2019-06-30 RX ADMIN — ATORVASTATIN CALCIUM 40 MG: 40 TABLET, FILM COATED ORAL at 20:39

## 2019-06-30 RX ADMIN — SODIUM CHLORIDE, POTASSIUM CHLORIDE, SODIUM LACTATE AND CALCIUM CHLORIDE 75 ML/HR: 600; 310; 30; 20 INJECTION, SOLUTION INTRAVENOUS at 23:29

## 2019-06-30 RX ADMIN — FAMOTIDINE 20 MG: 10 INJECTION INTRAVENOUS at 16:32

## 2019-06-30 RX ADMIN — TAZOBACTAM SODIUM AND PIPERACILLIN SODIUM 4.5 G: 500; 4 INJECTION, SOLUTION INTRAVENOUS at 16:31

## 2019-06-30 RX ADMIN — METHYLPREDNISOLONE SODIUM SUCCINATE 40 MG: 40 INJECTION, POWDER, FOR SOLUTION INTRAMUSCULAR; INTRAVENOUS at 20:39

## 2019-06-30 RX ADMIN — ASPIRIN 81 MG 162 MG: 81 TABLET ORAL at 16:55

## 2019-06-30 RX ADMIN — PIPERACILLIN SODIUM AND TAZOBACTAM SODIUM 3.38 G: 3; .375 INJECTION, POWDER, LYOPHILIZED, FOR SOLUTION INTRAVENOUS at 08:55

## 2019-06-30 RX ADMIN — SODIUM CHLORIDE 1578 ML: 9 INJECTION, SOLUTION INTRAVENOUS at 08:49

## 2019-06-30 RX ADMIN — ENOXAPARIN SODIUM 30 MG: 30 INJECTION SUBCUTANEOUS at 16:32

## 2019-06-30 RX ADMIN — GUAIFENESIN 600 MG: 600 TABLET, EXTENDED RELEASE ORAL at 20:39

## 2019-06-30 RX ADMIN — PERFLUTREN 8.48 MG: 6.52 INJECTION, SUSPENSION INTRAVENOUS at 17:06

## 2019-06-30 RX ADMIN — TAZOBACTAM SODIUM AND PIPERACILLIN SODIUM 4.5 G: 500; 4 INJECTION, SOLUTION INTRAVENOUS at 23:29

## 2019-06-30 RX ADMIN — IPRATROPIUM BROMIDE AND ALBUTEROL SULFATE 3 ML: 2.5; .5 SOLUTION RESPIRATORY (INHALATION) at 16:19

## 2019-07-01 LAB
ALBUMIN SERPL-MCNC: 2.6 G/DL (ref 3.5–5)
ALBUMIN/GLOB SERPL: 1 G/DL (ref 1.1–2.5)
ALP SERPL-CCNC: 478 U/L (ref 24–120)
ALT SERPL W P-5'-P-CCNC: 29 U/L (ref 0–54)
ANION GAP SERPL CALCULATED.3IONS-SCNC: 6 MMOL/L (ref 4–13)
ANISOCYTOSIS BLD QL: ABNORMAL
ARTICHOKE IGE QN: 58 MG/DL (ref 0–99)
AST SERPL-CCNC: 55 U/L (ref 7–45)
BILIRUB SERPL-MCNC: 0.5 MG/DL (ref 0.1–1)
BUN BLD-MCNC: 4 MG/DL (ref 5–21)
BUN/CREAT SERPL: 16.7 (ref 7–25)
BURR CELLS BLD QL SMEAR: ABNORMAL
CALCIUM SPEC-SCNC: 7.5 MG/DL (ref 8.4–10.4)
CHLORIDE SERPL-SCNC: 99 MMOL/L (ref 98–110)
CHOLEST SERPL-MCNC: 104 MG/DL (ref 130–200)
CO2 SERPL-SCNC: 28 MMOL/L (ref 24–31)
CREAT BLD-MCNC: 0.24 MG/DL (ref 0.5–1.4)
CYTOLOGIST CVX/VAG CYTO: NORMAL
DEPRECATED RDW RBC AUTO: 56.8 FL (ref 40–54)
ERYTHROCYTE [DISTWIDTH] IN BLOOD BY AUTOMATED COUNT: 20.8 % (ref 12–15)
GFR SERPL CREATININE-BSD FRML MDRD: >150 ML/MIN/1.73
GIANT PLATELETS: ABNORMAL
GLOBULIN UR ELPH-MCNC: 2.7 GM/DL
GLUCOSE BLD-MCNC: 122 MG/DL (ref 70–100)
HBA1C MFR BLD: 6.2 %
HCT VFR BLD AUTO: 29.7 % (ref 37–47)
HDLC SERPL-MCNC: 39 MG/DL
HGB BLD-MCNC: 9.4 G/DL (ref 12–16)
HYPOCHROMIA BLD QL: ABNORMAL
LDLC/HDLC SERPL: 1.08 {RATIO}
LIPASE SERPL-CCNC: 1151 U/L (ref 23–203)
LYMPHOCYTES # BLD MANUAL: 0.68 10*3/MM3 (ref 0.72–4.86)
LYMPHOCYTES NFR BLD MANUAL: 2 % (ref 4–12)
LYMPHOCYTES NFR BLD MANUAL: 7.1 % (ref 15–45)
MCH RBC QN AUTO: 24.3 PG (ref 28–32)
MCHC RBC AUTO-ENTMCNC: 31.6 G/DL (ref 33–36)
MCV RBC AUTO: 76.7 FL (ref 82–98)
METAMYELOCYTES NFR BLD MANUAL: 4 % (ref 0–0)
MICROCYTES BLD QL: ABNORMAL
MONOCYTES # BLD AUTO: 0.19 10*3/MM3 (ref 0.19–1.3)
MYELOCYTES NFR BLD MANUAL: 2 % (ref 0–0)
NEUTROPHILS # BLD AUTO: 8.14 10*3/MM3 (ref 1.87–8.4)
NEUTROPHILS NFR BLD MANUAL: 80.8 % (ref 39–78)
NEUTS BAND NFR BLD MANUAL: 4 % (ref 0–10)
OVALOCYTES BLD QL SMEAR: ABNORMAL
PATH INTERP BLD-IMP: NORMAL
PLATELET # BLD AUTO: 122 10*3/MM3 (ref 130–400)
PMV BLD AUTO: ABNORMAL FL (ref 6–12)
POIKILOCYTOSIS BLD QL SMEAR: ABNORMAL
POTASSIUM BLD-SCNC: 3.5 MMOL/L (ref 3.5–5.3)
PROT SERPL-MCNC: 5.3 G/DL (ref 6.3–8.7)
RBC # BLD AUTO: 3.87 10*6/MM3 (ref 4.2–5.4)
SCHISTOCYTES BLD QL SMEAR: ABNORMAL
SODIUM BLD-SCNC: 133 MMOL/L (ref 135–145)
TRIGL SERPL-MCNC: 114 MG/DL (ref 0–149)
TSH SERPL DL<=0.05 MIU/L-ACNC: 0.18 MIU/ML (ref 0.47–4.68)
WBC MORPH BLD: NORMAL
WBC NRBC COR # BLD: 9.59 10*3/MM3 (ref 4.8–10.8)

## 2019-07-01 PROCEDURE — 86334 IMMUNOFIX E-PHORESIS SERUM: CPT | Performed by: INTERNAL MEDICINE

## 2019-07-01 PROCEDURE — 82784 ASSAY IGA/IGD/IGG/IGM EACH: CPT | Performed by: INTERNAL MEDICINE

## 2019-07-01 PROCEDURE — 25010000002 ENOXAPARIN PER 10 MG: Performed by: FAMILY MEDICINE

## 2019-07-01 PROCEDURE — 85007 BL SMEAR W/DIFF WBC COUNT: CPT | Performed by: FAMILY MEDICINE

## 2019-07-01 PROCEDURE — G0378 HOSPITAL OBSERVATION PER HR: HCPCS

## 2019-07-01 PROCEDURE — 80053 COMPREHEN METABOLIC PANEL: CPT | Performed by: FAMILY MEDICINE

## 2019-07-01 PROCEDURE — 82785 ASSAY OF IGE: CPT | Performed by: INTERNAL MEDICINE

## 2019-07-01 PROCEDURE — 25010000002 VANCOMYCIN 10 G RECONSTITUTED SOLUTION: Performed by: FAMILY MEDICINE

## 2019-07-01 PROCEDURE — 83690 ASSAY OF LIPASE: CPT | Performed by: FAMILY MEDICINE

## 2019-07-01 PROCEDURE — 84165 PROTEIN E-PHORESIS SERUM: CPT | Performed by: INTERNAL MEDICINE

## 2019-07-01 PROCEDURE — 82232 ASSAY OF BETA-2 PROTEIN: CPT | Performed by: INTERNAL MEDICINE

## 2019-07-01 PROCEDURE — 83883 ASSAY NEPHELOMETRY NOT SPEC: CPT | Performed by: INTERNAL MEDICINE

## 2019-07-01 PROCEDURE — 94799 UNLISTED PULMONARY SVC/PX: CPT

## 2019-07-01 PROCEDURE — 25010000002 METHYLPREDNISOLONE PER 40 MG: Performed by: FAMILY MEDICINE

## 2019-07-01 PROCEDURE — 25010000002 MORPHINE PER 10 MG: Performed by: FAMILY MEDICINE

## 2019-07-01 PROCEDURE — 84443 ASSAY THYROID STIM HORMONE: CPT | Performed by: FAMILY MEDICINE

## 2019-07-01 PROCEDURE — 80061 LIPID PANEL: CPT | Performed by: FAMILY MEDICINE

## 2019-07-01 PROCEDURE — 82525 ASSAY OF COPPER: CPT | Performed by: INTERNAL MEDICINE

## 2019-07-01 PROCEDURE — 83036 HEMOGLOBIN GLYCOSYLATED A1C: CPT | Performed by: FAMILY MEDICINE

## 2019-07-01 PROCEDURE — 85025 COMPLETE CBC W/AUTO DIFF WBC: CPT | Performed by: FAMILY MEDICINE

## 2019-07-01 PROCEDURE — 92526 ORAL FUNCTION THERAPY: CPT

## 2019-07-01 PROCEDURE — 25010000002 PIPERACILLIN SOD-TAZOBACTAM PER 1 G: Performed by: FAMILY MEDICINE

## 2019-07-01 PROCEDURE — 25010000002 ONDANSETRON PER 1 MG: Performed by: FAMILY MEDICINE

## 2019-07-01 RX ORDER — AMOXICILLIN AND CLAVULANATE POTASSIUM 500; 125 MG/1; MG/1
1 TABLET, FILM COATED ORAL EVERY 12 HOURS SCHEDULED
Status: DISCONTINUED | OUTPATIENT
Start: 2019-07-01 | End: 2019-07-02 | Stop reason: HOSPADM

## 2019-07-01 RX ORDER — MORPHINE SULFATE 2 MG/ML
2 INJECTION, SOLUTION INTRAMUSCULAR; INTRAVENOUS
Status: DISCONTINUED | OUTPATIENT
Start: 2019-07-01 | End: 2019-07-02 | Stop reason: HOSPADM

## 2019-07-01 RX ADMIN — SODIUM CHLORIDE, POTASSIUM CHLORIDE, SODIUM LACTATE AND CALCIUM CHLORIDE 75 ML/HR: 600; 310; 30; 20 INJECTION, SOLUTION INTRAVENOUS at 18:45

## 2019-07-01 RX ADMIN — LEVOTHYROXINE SODIUM 25 MCG: 25 TABLET ORAL at 05:39

## 2019-07-01 RX ADMIN — METHYLPREDNISOLONE SODIUM SUCCINATE 40 MG: 40 INJECTION, POWDER, FOR SOLUTION INTRAMUSCULAR; INTRAVENOUS at 08:45

## 2019-07-01 RX ADMIN — ONDANSETRON HYDROCHLORIDE 4 MG: 2 SOLUTION INTRAMUSCULAR; INTRAVENOUS at 13:45

## 2019-07-01 RX ADMIN — FAMOTIDINE 20 MG: 10 INJECTION INTRAVENOUS at 21:07

## 2019-07-01 RX ADMIN — AMOXICILLIN AND CLAVULANATE POTASSIUM 500 MG: 500; 125 TABLET, FILM COATED ORAL at 20:53

## 2019-07-01 RX ADMIN — MORPHINE SULFATE 2 MG: 2 INJECTION, SOLUTION INTRAMUSCULAR; INTRAVENOUS at 10:51

## 2019-07-01 RX ADMIN — ASPIRIN 81 MG 162 MG: 81 TABLET ORAL at 08:45

## 2019-07-01 RX ADMIN — IPRATROPIUM BROMIDE AND ALBUTEROL SULFATE 3 ML: 2.5; .5 SOLUTION RESPIRATORY (INHALATION) at 20:12

## 2019-07-01 RX ADMIN — LEVOTHYROXINE SODIUM 200 MCG: 200 TABLET ORAL at 05:39

## 2019-07-01 RX ADMIN — GUAIFENESIN 600 MG: 600 TABLET, EXTENDED RELEASE ORAL at 20:53

## 2019-07-01 RX ADMIN — ESCITALOPRAM 20 MG: 10 TABLET, FILM COATED ORAL at 20:53

## 2019-07-01 RX ADMIN — SODIUM CHLORIDE, PRESERVATIVE FREE 3 ML: 5 INJECTION INTRAVENOUS at 21:11

## 2019-07-01 RX ADMIN — METHYLPREDNISOLONE SODIUM SUCCINATE 40 MG: 40 INJECTION, POWDER, FOR SOLUTION INTRAMUSCULAR; INTRAVENOUS at 03:30

## 2019-07-01 RX ADMIN — MORPHINE SULFATE 2 MG: 2 INJECTION, SOLUTION INTRAMUSCULAR; INTRAVENOUS at 23:08

## 2019-07-01 RX ADMIN — VANCOMYCIN HYDROCHLORIDE 500 MG: 10 INJECTION, POWDER, LYOPHILIZED, FOR SOLUTION INTRAVENOUS at 03:30

## 2019-07-01 RX ADMIN — METHYLPREDNISOLONE SODIUM SUCCINATE 40 MG: 40 INJECTION, POWDER, FOR SOLUTION INTRAMUSCULAR; INTRAVENOUS at 20:53

## 2019-07-01 RX ADMIN — MORPHINE SULFATE 2 MG: 2 INJECTION, SOLUTION INTRAMUSCULAR; INTRAVENOUS at 20:50

## 2019-07-01 RX ADMIN — TAZOBACTAM SODIUM AND PIPERACILLIN SODIUM 4.5 G: 500; 4 INJECTION, SOLUTION INTRAVENOUS at 06:08

## 2019-07-01 RX ADMIN — MORPHINE SULFATE 2 MG: 2 INJECTION, SOLUTION INTRAMUSCULAR; INTRAVENOUS at 18:45

## 2019-07-01 RX ADMIN — IPRATROPIUM BROMIDE AND ALBUTEROL SULFATE 3 ML: 2.5; .5 SOLUTION RESPIRATORY (INHALATION) at 14:33

## 2019-07-01 RX ADMIN — MORPHINE SULFATE 2 MG: 2 INJECTION, SOLUTION INTRAMUSCULAR; INTRAVENOUS at 16:03

## 2019-07-01 RX ADMIN — FAMOTIDINE 20 MG: 10 INJECTION INTRAVENOUS at 10:51

## 2019-07-01 RX ADMIN — ENOXAPARIN SODIUM 30 MG: 30 INJECTION SUBCUTANEOUS at 16:07

## 2019-07-01 RX ADMIN — IPRATROPIUM BROMIDE AND ALBUTEROL SULFATE 3 ML: 2.5; .5 SOLUTION RESPIRATORY (INHALATION) at 07:29

## 2019-07-01 RX ADMIN — ONDANSETRON HYDROCHLORIDE 4 MG: 2 SOLUTION INTRAMUSCULAR; INTRAVENOUS at 07:31

## 2019-07-01 RX ADMIN — GUAIFENESIN 600 MG: 600 TABLET, EXTENDED RELEASE ORAL at 08:45

## 2019-07-01 RX ADMIN — IPRATROPIUM BROMIDE AND ALBUTEROL SULFATE 3 ML: 2.5; .5 SOLUTION RESPIRATORY (INHALATION) at 11:45

## 2019-07-01 RX ADMIN — HYDROMORPHONE HYDROCHLORIDE 1 MG: 1 INJECTION, SOLUTION INTRAMUSCULAR; INTRAVENOUS; SUBCUTANEOUS at 01:11

## 2019-07-01 RX ADMIN — ATORVASTATIN CALCIUM 40 MG: 40 TABLET, FILM COATED ORAL at 20:53

## 2019-07-01 RX ADMIN — MORPHINE SULFATE 15 MG: 15 TABLET, FILM COATED, EXTENDED RELEASE ORAL at 19:17

## 2019-07-01 RX ADMIN — METHYLPREDNISOLONE SODIUM SUCCINATE 40 MG: 40 INJECTION, POWDER, FOR SOLUTION INTRAMUSCULAR; INTRAVENOUS at 16:03

## 2019-07-01 RX ADMIN — MORPHINE SULFATE 15 MG: 15 TABLET, FILM COATED, EXTENDED RELEASE ORAL at 04:50

## 2019-07-01 RX ADMIN — ONDANSETRON HYDROCHLORIDE 4 MG: 2 SOLUTION INTRAMUSCULAR; INTRAVENOUS at 19:38

## 2019-07-01 RX ADMIN — MORPHINE SULFATE 2 MG: 2 INJECTION, SOLUTION INTRAMUSCULAR; INTRAVENOUS at 13:45

## 2019-07-01 RX ADMIN — MORPHINE SULFATE 2 MG: 2 INJECTION, SOLUTION INTRAMUSCULAR; INTRAVENOUS at 08:45

## 2019-07-01 NOTE — PAYOR COMM NOTE
"[id number]  Admit inpt 6-30-19  UR PHONE    [phone]    Jud Dhillon (54 y.o. Female)     Date of Birth Social Security Number Address Home Phone MRN    1965  [address] [place] [id number] [phone] [id number]    Jehovah's witness Marital Status          Other        Admission Date Admission Type Admitting Provider Attending Provider Department, Room/Bed    6/30/19 Emergency Filippo Dhillon MD Moore, Jonathan Scott, MD [address], 495/1    Discharge Date Discharge Disposition Discharge Destination                       Attending Provider:  Filippo Dhillon MD    Allergies:  Avelox [Moxifloxacin Hcl], Codeine, Lortab [Hydrocodone-acetaminophen], Robaxin [Methocarbamol]    Isolation:  None   Infection:  None   Code Status:  No CPR    Ht:  160 cm (62.99\")   Wt:  52.6 kg (115 lb 15.4 oz)    Admission Cmt:  None   Principal Problem:  None                Active Insurance as of 6/30/2019     Primary Coverage     Payor Plan Insurance Group Employer/Plan Group    ANTHEM MEDICAID ANTH MEDICAID [id number]     Payor Plan Address Payor Plan Phone Number Payor Plan Fax Number Effective Dates    [address] [address] [phone]  4/1/2018 - None Entered    [facility] [id number]       Subscriber Name Subscriber Birth Date Member ID       JUD DHILLON 1965 [id number]                 Emergency Contacts      (Rel.) Home Phone Work Phone Mobile Phone    Marc Benavidez (Spouse) [phone] -- --    alda Dhillon (Son) -- -- [phone]    AlejoKemi (Daughter) -- -- [phone]               History & Physical      Juice Lamar MD at 6/30/2019 11:45 AM              [facility] Medicine Services  HISTORY AND PHYSICAL    Date of Admission: 6/30/2019  Primary Care Physician: Hernan Ybarra MD    Subjective     Chief Complaint: Failure to thrive/pneumonia/liver lesion/nausea/abdomen pain    History of Present " Illness  Patient is a 54-year-old female presents ER by the family wanted second opinion about her cancer.  Patient has a history of severe upper abdomen pain intermittently for the last 4 to 5 months.  Increase in abdomen pain with food.  Patient does not have desire to eat.  Patient lost about 30 pounds in the last 4 weeks.  About a week ago patient went to [facility] ER and was admitted with a diagnosis of pancreatitis.  It was discovered patient had multiple liver lesion and Dr. Drew was consulted.  Patient was discharged about 5 days ago with appointment to see Jeff in [place] to evaluate liver management on July 9-in about 2 weeks for now.  Patient stated to have intermediate weight loss and nausea vomiting.  Patient continues to lose weight.  Family stated patient is no longer ambulatory.  Patient came in for second opinion.    Patient also has continued chest pain and shortness of breath.  Has a history of MI 4 years ago.  Family states there is no cardiac work-up done.  Patient also has a history of COPD, patient does not require oxygen.  Patient denies any coughing or congestion.  Patient continued to have weakness since discharge and weight loss.  Primary complaint of lower extremity swelling since yesterday.    Family state patient's mother had a history of liver and pancreatic cancer.  Passed away in her 60s.  Father had small cell carcinoma patient has small bowel carcinoma with resection about 13 years ago.  Patient also have a history of thyroid cancer underwent radiation back in 2007.    Review of Systems   Constitutional: Positive for activity change, appetite change and fatigue. Negative for chills and fever.   HENT: Negative for hearing loss, nosebleeds, tinnitus and trouble swallowing.    Eyes: Negative for visual disturbance.   Respiratory: Positive for shortness of breath and wheezing. Negative for cough and chest tightness.    Cardiovascular: Positive for leg swelling. Negative for  chest pain and palpitations.   Gastrointestinal: Positive for nausea. Negative for abdominal distention, abdominal pain, blood in stool, constipation, diarrhea and vomiting.   Endocrine: Negative for cold intolerance, heat intolerance, polydipsia, polyphagia and polyuria.   Genitourinary: Negative for decreased urine volume, difficulty urinating, dysuria, flank pain, frequency and hematuria.   Musculoskeletal: Negative for arthralgias, joint swelling and myalgias.   Skin: Negative for rash.   Allergic/Immunologic: Negative for immunocompromised state.   Neurological: Positive for weakness. Negative for dizziness, syncope, light-headedness and headaches.   Hematological: Negative for adenopathy. Does not bruise/bleed easily.   Psychiatric/Behavioral: Negative for confusion and sleep disturbance. The patient is not nervous/anxious.         Otherwise complete ROS reviewed and negative except as mentioned in the HPI.      Past Medical History:   Past Medical History:   Diagnosis Date   • Asthma    • Carotid artery disease (CMS/HCC)    • COPD (chronic obstructive pulmonary disease) (CMS/HCC)    • Dizziness    • Hypertension    • Other kyphosis, thoracic region    • Pneumonia    • Small bowel carcinoma (CMS/HCC)     History of small bowel carcinoma and thyroid cancer   • Tinnitus    • Tobacco abuse        Past Surgical History:  Past Surgical History:   Procedure Laterality Date   • APPENDECTOMY     • CHOLECYSTECTOMY         Family History: family history includes Cancer in her father, maternal uncle, mother, paternal grandfather, and paternal uncle; Heart attack in her maternal grandmother.    Social History:  reports that she has been smoking.  She has a 22.50 pack-year smoking history. She has never used smokeless tobacco. She reports that she does not drink alcohol or use drugs.    Medications:  Prior to Admission medications    Medication Sig Start Date End Date Taking? Authorizing Provider   albuterol (PROVENTIL)  "(2.5 MG/3ML) 0.083% nebulizer solution  6/14/18   Lona Carney MD   aspirin 81 MG tablet Take 81 mg by mouth.    Lona Carney MD   atorvastatin (LIPITOR) 40 MG tablet  6/14/18   Lona Carney MD   baclofen (LIORESAL) 10 MG tablet  6/14/18   Lona Carney MD   escitalopram (LEXAPRO) 20 MG tablet  6/14/18   Lona Carney MD   furosemide (LASIX) 20 MG tablet  6/14/18   Angelika, MD Lona   lisinopril (PRINIVIL,ZESTRIL) 2.5 MG tablet Take 2.5 mg by mouth Daily.    Lona Carney MD   meclizine (ANTIVERT) 25 MG tablet  6/14/18   Lona Carney MD   metoprolol succinate XL (TOPROL-XL) 25 MG 24 hr tablet  6/14/18   ProviderLona MD   Multiple Vitamins-Minerals (SENTRY PO)  6/14/18   Lona Carney MD   ondansetron ODT (ZOFRAN-ODT) 4 MG disintegrating tablet  5/14/18   ProviderLona MD   promethazine (PHENERGAN) 25 MG tablet  6/18/18   Provider, MD Lona   QUEtiapine (SEROquel) 50 MG tablet  6/14/18   Lona Carney MD   raNITIdine (ZANTAC) 150 MG tablet  6/14/18   Provider, MD Lona   VENTOLIN  (90 Base) MCG/ACT inhaler  6/14/18   Lona Carney MD     Allergies:  Allergies   Allergen Reactions   • Avelox [Moxifloxacin Hcl] Unknown (See Comments)     unknown   • Codeine Unknown (See Comments)     unknown   • Lortab [Hydrocodone-Acetaminophen] Unknown (See Comments)     Unknown   • Robaxin [Methocarbamol] Unknown (See Comments)     unknown       Objective     Vital Signs: /80   Pulse 90   Temp 97.9 °F (36.6 °C) (Oral)   Resp 13   Ht 160 cm (63\")   Wt 52.6 kg (116 lb)   SpO2 96%   BMI 20.55 kg/m²    Physical Exam   Constitutional: She is oriented to person, place, and time. She appears well-developed.   Cachectic.   HENT:   Head: Normocephalic.   Eyes: Conjunctivae are normal. Pupils are equal, round, and reactive to light.   Neck: Neck supple. No JVD present. No thyromegaly present. "   Cardiovascular: Normal rate, regular rhythm, normal heart sounds and intact distal pulses. Exam reveals no gallop and no friction rub.   No murmur heard.  Pulmonary/Chest: No respiratory distress. She has wheezes. She has no rales. She exhibits no tenderness.   Decreased breath sound bilateral,  wheezing.   Abdominal: Soft. Bowel sounds are normal. She exhibits no distension. There is no tenderness. There is no rebound and no guarding.   Musculoskeletal: Normal range of motion. She exhibits edema. She exhibits no tenderness or deformity.   2-3+ pitting edema left lower extremity.   Lymphadenopathy:     She has no cervical adenopathy.   Neurological: She is alert and oriented to person, place, and time. She displays normal reflexes. No cranial nerve deficit. She exhibits abnormal muscle tone. Coordination abnormal.   Skin: Skin is warm and dry. Capillary refill takes 2 to 3 seconds. No rash noted.   Psychiatric: She has a normal mood and affect. Her behavior is normal.   Nursing note and vitals reviewed.          Results Reviewed:    Lab Results (last 24 hours)     Procedure Component Value Units Date/Time    [facility] Draw [[id number]] Collected:  06/30/19 0941    Specimen:  Blood Updated:  06/30/19 1045    Narrative:       The following orders were created for panel order [facility] Draw.  Procedure                               Abnormality         Status                     ---------                               -----------         ------                     Red Top[[id number]]                                          Final result                 Please view results for these tests on the individual orders.    Red Top [[id number]] Collected:  06/30/19 0941    Specimen:  Blood Updated:  06/30/19 1045     Extra Tube Hold for add-ons.     Comment: Auto resulted.       Protime-INR [[id number]]  (Normal) Collected:  06/30/19 0941    Specimen:  Blood Updated:  06/30/19 0957     Protime 14.0 Seconds      INR 1.05    aPTT  [[id number]]  (Abnormal) Collected:  06/30/19 0941    Specimen:  Blood Updated:  06/30/19 0957     PTT 35.8 seconds     Manual Differential [[id number]]  (Abnormal) Collected:  06/30/19 0831    Specimen:  Blood Updated:  06/30/19 0931     Neutrophil % 74.0 %      Lymphocyte % 13.0 %      Monocyte % 3.0 %      Eosinophil % 4.0 %      Bands %  4.0 %      Metamyelocyte % 2.0 %      Neutrophils Absolute 5.43 10*3/mm3      Lymphocytes Absolute 0.90 10*3/mm3      Monocytes Absolute 0.21 10*3/mm3      Eosinophils Absolute 0.28 10*3/mm3      nRBC 1.0 /100 WBC      Acanthocytes Slight/1+     Anisocytosis Slight/1+     [place]way Cells Slight/1+     Microcytes Slight/1+     Ovalocytes Slight/1+     Poikilocytes Large/3+     Polychromasia Slight/1+     Schistocytes Slight/1+     WBC Morphology Normal     Giant Platelets Slight/1+    CBC & Differential [[id number]] Collected:  06/30/19 0831    Specimen:  Blood Updated:  06/30/19 0931    Narrative:       The following orders were created for panel order CBC & Differential.  Procedure                               Abnormality         Status                     ---------                               -----------         ------                     CBC Auto Differential[[id number]]        Abnormal            Final result                 Please view results for these tests on the individual orders.    CBC Auto Differential [[id number]]  (Abnormal) Collected:  06/30/19 0831    Specimen:  Blood Updated:  06/30/19 0931     WBC 6.96 10*3/mm3      RBC 3.95 10*6/mm3      Hemoglobin 9.5 g/dL      Hematocrit 30.5 %      MCV 77.2 fL      MCH 24.1 pg      MCHC 31.1 g/dL      RDW 20.9 %      RDW-SD 57.4 fl      MPV 10.1 fL      Platelets 139 10*3/mm3     Narrative:       ckd    Blood Culture - Blood, Arm, Left [[id number]] Collected:  06/30/19 0829    Specimen:  Blood from Arm, Left Updated:  06/30/19 0928    Blood Culture - Blood, Arm, Right [[id number]] Collected:  06/30/19 0840    Specimen:  Blood from  Arm, Right Updated:  06/30/19 0928    Urinalysis With Culture If Indicated - Urine, Catheter In/Out [[id number]]  (Abnormal) Collected:  06/30/19 0847    Specimen:  Urine, Catheter In/Out Updated:  06/30/19 0917     Color, UA Dark Yellow     Appearance, UA Clear     pH, UA 6.0     Specific Gravity, UA 1.023     Glucose, UA Negative     Ketones, UA 80 mg/dL (3+)     Bilirubin, UA Moderate (2+)     Blood, UA Negative     Protein, UA 30 mg/dL (1+)     Leuk Esterase, UA Negative     Nitrite, UA Negative     Urobilinogen, UA 2.0 E.U./dL    Urinalysis, Microscopic Only - Urine, Catheter In/Out [[id number]]  (Abnormal) Collected:  06/30/19 0847    Specimen:  Urine, Catheter In/Out Updated:  06/30/19 0917     RBC, UA None Seen /HPF      WBC, UA 0-2 /HPF      Bacteria, UA None Seen /HPF      Squamous Epithelial Cells, UA 0-2 /HPF      Hyaline Casts, UA None Seen /LPF      Methodology Manual Light Microscopy    Blood Gas, Arterial [[id number]]  (Abnormal) Collected:  06/30/19 0905    Specimen:  Arterial Blood Updated:  06/30/19 0916     Site Left Brachial     [place]'s Test N/A     pH, Arterial 7.434 pH units      pCO2, Arterial 40.3 mm Hg      pO2, Arterial 79.9 mm Hg      Comment: 84 Value below reference range        HCO3, Arterial 27.0 mmol/L      Comment: 83 Value above reference range        Base Excess, Arterial 2.5 mmol/L      Comment: 83 Value above reference range        O2 Saturation, Arterial 94.4 %      Temperature 37.0 C      Barometric Pressure for Blood Gas 753 mmHg      Modality Room Air     Flow Rate 2.0 lpm      Ventilator Mode NA     Collected by [id number]     Comment: Meter: [id number]     :  [id number]       Procalcitonin [[id number]]  (Abnormal) Collected:  06/30/19 0831    Specimen:  Blood Updated:  06/30/19 0908     Procalcitonin 0.59 ng/mL     Narrative:       SIRS, sepsis, severe sepsis, and septic shock are categorized according to the criteria of the consensus conference of the American College  of Chest Physicians/Society of Critical Care Medicine.    PCT < 0.5 ng/mL     Systemic infection (sepsis) is not likely.    PCT >0.5 and < 2.0 ng/mL Systemic infection (sepsis) is possible, but other conditions are known to elevate PCT as well.    PCT > 2.0 ng/mL     Systemic infection (sepsis) is likely, unless other causes are known.      PCT > 10.0 ng/mL    Important systemic inflammatory response, almost exclusively due to severe bacterial sepsis or septic shock.    PCT values of < 0.5 ng/mL do not exclude an infection, because localized infections (without systemic signs) may be associated with such low concentrations, or a systemic infection in its initial stages (<6 hours).  Increased PCT can occur without infection.  PCT concentrations between 0.5 and 2.0 ng/mL should be interpreted taking into account the patients history.  It is recommended to retest PCT within 6-24 hours if any concentrations < 2.0 ng/mL are obtained.    Ammonia [[id number]]  (Normal) Collected:  06/30/19 0831    Specimen:  Blood Updated:  06/30/19 0904     Ammonia 13 umol/L     Troponin [[id number]]  (Abnormal) Collected:  06/30/19 0831    Specimen:  Blood Updated:  06/30/19 0902     Troponin I 0.046 ng/mL     BNP [[id number]]  (Abnormal) Collected:  06/30/19 0831    Specimen:  Blood Updated:  06/30/19 0902     proBNP 2,580.0 pg/mL     Urine Culture - Urine, Urine, Catheter In/Out [[id number]] Collected:  06/30/19 0847    Specimen:  Urine, Catheter In/Out Updated:  06/30/19 0853    Lactic Acid, Plasma [[id number]]  (Normal) Collected:  06/30/19 0831    Specimen:  Blood Updated:  06/30/19 0851     Lactate 1.2 mmol/L     Lipase [[id number]]  (Abnormal) Collected:  06/30/19 0831    Specimen:  Blood Updated:  06/30/19 0851     Lipase 762 U/L     Amylase [[id number]]  (Abnormal) Collected:  06/30/19 0831    Specimen:  Blood Updated:  06/30/19 0851     Amylase 147 U/L     Comprehensive Metabolic Panel [[id number]]  (Abnormal) Collected:  06/30/19 0831     Specimen:  Blood Updated:  06/30/19 0851     Glucose 82 mg/dL      BUN 8 mg/dL      Creatinine 0.35 mg/dL      Sodium 134 mmol/L      Potassium 3.7 mmol/L      Chloride 98 mmol/L      CO2 29.0 mmol/L      Calcium 8.3 mg/dL      Total Protein 6.1 g/dL      Albumin 3.10 g/dL      ALT (SGPT) 32 U/L      AST (SGOT) 73 U/L      Alkaline Phosphatase 599 U/L      Total Bilirubin 0.7 mg/dL      eGFR Non African Amer >150 mL/min/1.73      Globulin 3.0 gm/dL      A/G Ratio 1.0 g/dL      BUN/Creatinine Ratio 22.9     Anion Gap 7.0 mmol/L     Narrative:       GFR Normal >60  Chronic Kidney Disease <60  Kidney Failure <15           Radiology Data:    Imaging Results (last 24 hours)     Procedure Component Value Units Date/Time    XR Abdomen 2 View With Chest 1 View [[id number]] Collected:  06/30/19 0828     Updated:  06/30/19 0834    Narrative:       EXAMINATION: XR ABDOMEN 2 VW W CHEST 1 VW- 6/30/2019 8:28 AM CDT     HISTORY: Chest pain.     REPORT: A frontal view the chest was obtained as well as upright and  supine views of the abdomen. Comparison is made with the chest x-ray  05/07/2008, [facility] 05/01/2008.     There is dense consolidation of the right upper lobe compatible with  acute pneumonia. There are coarse interstitial markings diffusely. There  is patchy infiltrate in the left lateral midlung zone. No pneumothorax  or pleural effusion is identified. Heart size is normal. There is  ectasia of the thoracic aorta. These of the abdomen demonstrate multiple  surgical clips in the right abdomen, no free air is identified. The  bowel gas pattern is nonspecific. The osseous structures show no acute  findings. There are no suspicious intra-abdominal calcifications.       Impression:       1. Consolidating infiltrate in the right upper lobe and patchy  infiltrate in the left midlung zone compatible with acute pneumonia.  Follow-up is recommended until the infiltrates resolve to exclude  underlying neoplasm. Chronic interstitial  disease.  2. Nonspecific bowel gas pattern with no convincing evidence of  obstruction. No free air. Extensive previous right-sided abdominal  surgery.  This report was finalized on 06/30/2019 08:31 by Dr. Bandar Lindsey MD.          I have personally reviewed and interpreted the radiology studies and ECG obtained at time of admission.     Assessment / Plan      Assessment & Plan  Active Hospital Problems    Diagnosis   • Failure to thrive in adult     Plans    Pneumonia/COPD.  Chest ray shows right middle lobe consolidation.  Continue Zosyn and vancomycin for now.  Duo nebs 4 times daily.  Continue oxygen.  Admit to telemetry.  Continue Solu-Medrol.  Continue Mucinex.    Failure to thrive/30 pound weight loss over 4 weeks.    Liver lesions.  She had abdomen ultrasound and CT scan of abdomen and pelvic at [facility] about a week ago.  Try to get the results.   Patient has appoint to see [facility] in [place] on July 9, in 2 weeks.  Ultrasound of the abdomen on 6/20/19 from [facility]- liver is heterogeneous with multiple lesion suggestive of metastatic disease, no gallbladder, pancreas appeared to be large in size.  CT scan abdomen pelvic with IV contrast from [facility] 06/19/2019 shows new metastatic disease to the liver, distal small bowel dilatation without significant bowel obstruction.  Consult oncology.  Consult palliative care.    Chest pain/CAD/hypertension/hyperlipidemia.  Currently patient denies any chest pain.  Slightly elevated troponin.  Trend troponin.  Echocardiogram.  BNP 2.5K.  Continue aspirin.  Continue Lipitor.  Hold blood pressure medication due to hypotension.  Hold Lasix also due to hypotension.    Pancreatitis/abdomen pain.  Lipase 760.  Amylase 147.  Family stated previous lipase at [facility] was 18,000.  X-ray of abdomen pelvic-consolidation infiltrate right upper lobe with patchy infiltrate in the left mid lung zone compatible with acute pneumonia, chronic interstitial  "disease, nonspecific bowel gas pattern- no convincing evidence of obstruction, no free air, extensive previous right side abdomen surgery.    Anemia.  Anemia panel.    Nausea/vomiting.  Pepcid IV.  Zofran.    Depression.  Continues Lexapro.     Thyroidism.  Continue Synthroid.  TSH.    Chronic pain.  Continue    Nutrition.  Speech consult to evaluate swallowing.  Nutrition consult.  Regular diet if able.    Deconditioning/failure to thrive.  PT and OT consult.    Blood cultures pending.  Urine cultures pending.    Code Status: full code     I discussed the patient's findings and my recommendations with: Pt and family    Estimated length of stay: 1-4 days.   Juice Lamar MD   06/30/19   11:46 AM              Electronically signed by Juice Lamar MD at 6/30/2019 12:43 PM          Emergency Department Notes      Sil Cherry PA at 6/30/2019  8:11 AM     Attestation signed by Fern Cheek MD at 6/30/2019 11:45 AM          For this patient encounter, I reviewed the NP or PA documentation, treatment plan, and medical decision making. Fern Cheek MD 6/30/2019 11:45 AM                  Subjective   History of Present Illness    Patient is a pleasant 54-year-old female who presents to ED with her several family members.  Chief complaint is \"they think she has cancer and we want a second opinion.\"    The patient describes that she has had severe upper abdominal pain intermittently for the past 4 to 5 months.  She has pain postprandially and has no desire to eat.  Her son reports that she is lost about 30 pounds in the past 3 to 4 weeks.  Patient informed the family of this last week. With her severe pain become more intensified, she went to [facility] ER last week.  She was admitted under hospitalist services.  Her son reports that the initial diagnosis of pancreatitis.  During the hospitalization, she completed an ultrasound of her abdomen as well.  The patient reports there " was a large mass on the liver among other several masses on the liver and pancreas.  They suspect cancer.  They were trying to establish care for the patient at [facility] in [place].  She has an appointment July 9 in 2 weeks.  She was discharged from their facility about 5 days ago.  In the interim, she continues to eat minimally.  Her daughter describes that she is only had one can of soup in the past 2 weeks.  Her bowel movements have diminished.  She continues to lose weight.  She was able to ambulate while in the hospital but is no longer ambulating because she is so weak.  Her son describes that she is being packed around.  The family is concerned that the patient may not do well until they are seen by oncologist at [facility].  They wanted to come to this facility for a second opinion.    The patient does complain of associated chest pain that is continuous.  She also has shortness of breath that is worsening.  She has a history of myocardial infarction 3 to 4 years ago.  She has not had any other cardiac studies since then.  She also is a history of COPD but does not wear oxygen at home.  She denies any new cough or congestion.  She complains of subjective fever while in the hospital but none since then.  She does complain of increased weakness.  She has increased urinary output secondary to being on a diuretic.  She has bilateral lower extremity edema that waxes and wanes.  Today swelling is better than yesterday's.    The patient's mother has a history of liver and pancreatic cancer.  She passed away in her 60s.  Her father had small cell carcinoma.  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  She also history of thyroid cancer that underwent radiation back in 2007.      Review of Systems   Constitutional: Positive for activity change, appetite change, fatigue and fever.   HENT: Negative.    Eyes: Negative.    Respiratory: Positive for cough, chest tightness and  "shortness of breath.    Cardiovascular: Positive for chest pain and leg swelling.   Gastrointestinal: Positive for abdominal pain, constipation and nausea.   Genitourinary: Positive for frequency.   Musculoskeletal: Negative.    Skin: Negative.    Neurological: Positive for weakness and light-headedness.   Hematological: Bruises/bleeds easily.   Psychiatric/Behavioral: Negative.        Medications   sodium chloride 0.9 % infusion (not administered)   sodium chloride 0.9 % bolus 1,000 mL (not administered)   fentaNYL citrate (PF) (SUBLIMAZE) injection 52.5 mcg (not administered)   sodium chloride 0.9 % bolus 1,578 mL (1,578 mL Intravenous New Bag 6/30/19 0849)   vancomycin 1 g/250 mL 0.9% NS (vial-mate) (1,000 mg Intravenous New Bag 6/30/19 0942)   piperacillin-tazobactam (ZOSYN) 3.375 g/100 mL 0.9% NS IVPB (mbp) (3.375 g Intravenous New Bag 6/30/19 0855)   methylPREDNISolone sodium succinate (SOLU-Medrol) injection 125 mg (125 mg Intravenous Given 6/30/19 0854)   albuterol (PROVENTIL) nebulizer solution 0.083% 2.5 mg/3mL (2.5 mg Nebulization Given 6/30/19 0912)       /76   Pulse 85   Temp 97.9 °F (36.6 °C) (Oral)   Resp 18   Ht 160 cm (63\")   Wt 52.6 kg (116 lb)   SpO2 100%   BMI 20.55 kg/m²        Objective   Physical Exam   Constitutional: She is oriented to person, place, and time. She appears lethargic. She appears cachectic. She is cooperative. She has a sickly appearance. No distress.   Patient is able to provide a fair history.  She is slow to respond but answers appropriately.  Sometimes she forgets the question immediately after asked.   HENT:   Head: Normocephalic and atraumatic.   Mouth/Throat: Mucous membranes are dry.   Eyes: Conjunctivae and EOM are normal. Pupils are equal, round, and reactive to light.   Neck: Normal range of motion. Neck supple. No tracheal deviation present.   Cardiovascular: Normal rate, regular rhythm, normal heart sounds and intact distal pulses.   No murmur " heard.  Pulmonary/Chest: Effort normal and breath sounds normal.   Abdominal: Soft. Bowel sounds are normal. She exhibits no distension and no mass. There is no tenderness. There is no rebound and no guarding.   Musculoskeletal: Normal range of motion. She exhibits no edema.   2+ pitting edema to BLE with some bruising on BLE   Neurological: She is oriented to person, place, and time. She has normal reflexes. She appears lethargic. She displays atrophy. She exhibits normal muscle tone. Coordination normal.   Skin: Skin is warm and dry. Capillary refill takes less than 2 seconds. She is not diaphoretic.   Psychiatric: She has a normal mood and affect. Her behavior is normal. Judgment and thought content normal.   Nursing note and vitals reviewed.      Procedures    Xr Abdomen 2 View With Chest 1 View    Result Date: 6/30/2019  Narrative: EXAMINATION: XR ABDOMEN 2 VW W CHEST 1 VW- 6/30/2019 8:28 AM CDT  HISTORY: Chest pain.  REPORT: A frontal view the chest was obtained as well as upright and supine views of the abdomen. Comparison is made with the chest x-ray 05/07/2008, KUB 05/01/2008.  There is dense consolidation of the right upper lobe compatible with acute pneumonia. There are coarse interstitial markings diffusely. There is patchy infiltrate in the left lateral midlung zone. No pneumothorax or pleural effusion is identified. Heart size is normal. There is ectasia of the thoracic aorta. These of the abdomen demonstrate multiple surgical clips in the right abdomen, no free air is identified. The bowel gas pattern is nonspecific. The osseous structures show no acute findings. There are no suspicious intra-abdominal calcifications.      Impression: 1. Consolidating infiltrate in the right upper lobe and patchy infiltrate in the left midlung zone compatible with acute pneumonia. Follow-up is recommended until the infiltrates resolve to exclude underlying neoplasm. Chronic interstitial disease. 2. Nonspecific bowel  "gas pattern with no convincing evidence of obstruction. No free air. Extensive previous right-sided abdominal surgery. This report was finalized on 06/30/2019 08:31 by Dr. Bandar Lindsey MD.      ED Course  ED Course as of Jun 30 1048   Sun Jun 30, 2019   1021 Some of the patient's records from [place] purchase his return.  Documentation shows that she has new metastatic liver lesions with the acute pancreatitis.  Radiological results were not faxed over with the initial paperwork.  Still pending. Modality: Room Air [TK]   1026 Patient and numerous family members have been updated on the patient's evidence of a large pneumonia on the right side of possible neoplasm underlying.  She does have evidence of hypoxia and has been placed on oxygen.  Her troponin and BNP are both elevated.  Zosyn and vancomycin IV as well as Solu-Medrol and breathing treatments have been administered.  I have a call out to hospitalist at this time.  [TK]   1043 Patient's right upper quadrant ultrasound information has returned.  This is completed on June 19, 2019.  Impression read as \"the liver is heterogenous with multiple lesions suggested.  Metastatic disease would be the most likely explanation for the findings.\"  Read by Dr. Eduin Leyva  [TK]   1044 I have spoken to Dr. Lopez, hospitalist on-call.  He will admit the patient under their services.  [TK]      ED Course User Index  [TK] Sil Cherry PA          [facility]    Final diagnoses:   Failure to thrive in adult   Pneumonia of right middle lobe due to infectious organism (CMS/HCC)   Hypoxia   Liver lesion   Idiopathic acute pancreatitis, unspecified complication status   Elevated brain natriuretic peptide (BNP) level   Elevated troponin   Anemia, unspecified type   Elevated alkaline phosphatase level          Sil Cherry PA  06/30/19 1048      Electronically signed by Fern Cheek MD at 6/30/2019 11:45 AM       ICU Vital Signs     Row Name 07/01/19 " "0428 06/30/19 2356 06/30/19 2057 06/30/19 2015 06/30/19 1929       Vitals    Temp  97.6 °F (36.4 °C)  97.5 °F (36.4 °C)  97.6 °F (36.4 °C)  --  --    Temp src  Oral  Oral  Oral  --  --    Pulse  94  87  88  --  87    Heart Rate Source  Monitor  Monitor  Monitor  --  Monitor    Resp  18 16  18  --  16    Resp Rate Source  Visual  Visual  Visual  --  Visual    BP  149/76  133/83  100/74  --  --    Noninvasive MAP (mmHg)  --  99  --  --  --    BP Location  Left arm  Left arm  Right arm  --  --    BP Method  Automatic  Automatic  Automatic  --  --    Patient Position  Lying  Lying  Lying  --  --       Oxygen Therapy    SpO2  98 %  99 %  97 %  --  96 %    Pulse Oximetry Type  Continuous  Continuous  Continuous  --  Intermittent    Device (Oxygen Therapy)  nasal cannula  nasal cannula  nasal cannula  nasal cannula  nasal cannula    Flow (L/min)  2  --  2  2  2    Oxygen Concentration (%)  --  2  --  --  --    Row Name 06/30/19 1920 06/30/19 1625 06/30/19 1622 06/30/19 1619 06/30/19 1424       Height and Weight    Height  --  --  --  --  160 cm (62.99\")    Weight  --  --  --  --  52.6 kg (115 lb 15.4 oz)    Ideal Body Weight (IBW) (kg)  --  --  --  --  52.7    BSA (Calculated - sq m)  --  --  --  --  1.53 sq meters    BMI (Calculated)  --  --  --  --  20.5    Weight in (lb) to have BMI = 25  --  --  --  --  140.8       Vitals    Temp  --  --  97.8 °F (36.6 °C)  --  --    Temp src  --  --  Axillary  --  --    Pulse  87  90  88  89  --    Heart Rate Source  Monitor  Monitor  Monitor  Monitor  --    Resp  16  16  16  16  --    Resp Rate Source  Visual  Visual  Visual  Visual  --    BP  --  --  110/72  --  --    BP Location  --  --  Left arm  --  --    BP Method  --  --  Automatic  --  --    Patient Position  --  --  Sitting  --  --       Oxygen Therapy    SpO2  98 %  97 %  96 %  98 %  --    Pulse Oximetry Type  Intermittent  Intermittent  Intermittent  Intermittent  --    Device (Oxygen Therapy)  nasal cannula  nasal cannula "  nasal cannula  nasal cannula  --    Flow (L/min)  2  2  2  2  --    Row Name 06/30/19 1418 06/30/19 1205 06/30/19 11:14:41 06/30/19 1101 06/30/19 1046       Vitals    Temp  97.7 °F (36.5 °C)  97.6 °F (36.4 °C)  97.9 °F (36.6 °C)  --  --    Temp src  Oral  Oral  Oral  --  --    Pulse  90  89  --  90  92    Heart Rate Source  Monitor  Monitor  --  --  --    Resp  15  16  13  --  --    Resp Rate Source  Visual  Visual  Monitor  --  --    BP  118/76  121/71  --  128/80  128/76    BP Location  Left arm  Left arm  --  --  --    BP Method  Automatic  Automatic  --  --  --    Patient Position  Lying  Lying  --  --  --       Oxygen Therapy    SpO2  97 %  96 %  --  96 %  98 %    Pulse Oximetry Type  Intermittent  Intermittent  --  --  --    Device (Oxygen Therapy)  nasal cannula  nasal cannula  nasal cannula  --  --    Flow (L/min)  2  2  2  --  --    Row Name 06/30/19 1031 06/30/19 1016 06/30/19 1002 06/30/19 0947 06/30/19 0932       Vitals    Pulse  93  88  90  92  90    BP  116/103  (Abnormal)   116/86  119/81  118/77  117/72       Oxygen Therapy    SpO2  98 %  98 %  99 %  99 %  98 %    Row Name 06/30/19 0920 06/30/19 0916 06/30/19 0912 06/30/19 0901 06/30/19 0847       Vitals    Pulse  85  87  85  87  92    Heart Rate Source  Monitor  --  Monitor  --  --    Resp  18  --  22  --  --    Resp Rate Source  Visual  --  Visual  --  --    BP  --  126/76  --  104/76  105/69    Patient Position  --  --  Lying  --  --       Oxygen Therapy    SpO2  100 %  100 %  100 %  97 %  88 %  (Abnormal)     Pulse Oximetry Type  Continuous  --  Continuous  --  --    Device (Oxygen Therapy)  nasal cannula  --  nasal cannula  --  --    Flow (L/min)  2  --  2  --  --    Row Name 06/30/19 0845 06/30/19 0829 06/30/19 0815 06/30/19 0802 06/30/19 0801       Vitals    Pulse  --  94  93  --  91    BP  --  --  --  99/73  --       Oxygen Therapy    SpO2  --  90 %  90 %  --  90 %    Device (Oxygen Therapy)  nasal cannula  --  --  --  --    Flow (L/min)  2   "--  --  --  --    Row Name 06/30/19 0747 06/30/19 0737                Height and Weight    Height  --  160 cm (63\")       Height Method  --  Stated       Weight  --  52.6 kg (116 lb)       Weight Method  --  Stated       Ideal Body Weight (IBW) (kg)  --  52.72       BSA (Calculated - sq m)  --  1.53 sq meters       BMI (Calculated)  --  20.6       Weight in (lb) to have BMI = 25  --  140.8          Vitals    Temp  --  97.9 °F (36.6 °C)       Temp src  --  Oral       Pulse  89  94       Heart Rate Source  --  Monitor       Resp  --  20       Resp Rate Source  --  Visual       BP  107/66  101/60       BP Location  --  Right arm       BP Method  --  Automatic       Patient Position  --  Sitting          Oxygen Therapy    SpO2  90 %  97 %       Pulse Oximetry Type  --  Intermittent       Device (Oxygen Therapy)  --  room air           Intake & Output (last day)       06/30 0701 - 07/01 0700 07/01 0701 - 07/02 0700    P.O. 480     I.V. (mL/kg) 201.3 (3.8)     IV Piggyback 2301.1     Total Intake(mL/kg) 2982.4 (56.7)     Urine (mL/kg/hr) 200     Total Output 200     Net +2782.4           Urine Unmeasured Occurrence 1 x     Stool Unmeasured Occurrence 1 x         Lines, Drains & Airways    Active LDAs     Name:   Placement date:   Placement time:   Site:   Days:    Peripheral IV 06/30/19 0835 Left Forearm   06/30/19    0835    Forearm   less than 1    Peripheral IV 06/30/19 0941 Right Forearm   06/30/19    0941    Forearm   less than 1         Inactive LDAs     None                Hospital Medications (all)       Dose Frequency Start End    albuterol (PROVENTIL) nebulizer solution 0.083% 2.5 mg/3mL 2.5 mg Once 6/30/2019 6/30/2019    Sig - Route: Take 2.5 mg by nebulization 1 (One) Time. - Nebulization    Cosign for Ordering: Accepted by Fern Cheek MD on 6/30/2019  9:44 AM    aspirin chewable tablet 162 mg 162 mg Daily 6/30/2019     Sig - Route: Chew 2 tablets Daily. - Oral    atorvastatin (LIPITOR) tablet 40 mg 40 " mg Nightly 6/30/2019     Sig - Route: Take 1 tablet by mouth Every Night. - Oral    enoxaparin (LOVENOX) syringe 30 mg 30 mg Every 24 Hours 6/30/2019     Sig - Route: Inject 0.3 mL under the skin into the appropriate area as directed Daily. - Subcutaneous    escitalopram (LEXAPRO) tablet 20 mg 20 mg Nightly 6/30/2019     Sig - Route: Take 2 tablets by mouth Every Night. - Oral    famotidine (PEPCID) injection 20 mg 20 mg 2 Times Daily 6/30/2019     Sig - Route: Infuse 2 mL into a venous catheter 2 (Two) Times a Day. - Intravenous    fentaNYL citrate (PF) (SUBLIMAZE) injection 52.5 mcg 1 mcg/kg × 52.6 kg Once 6/30/2019 6/30/2019    Sig - Route: Infuse 1.05 mL into a venous catheter 1 (One) Time. - Intravenous    guaiFENesin (MUCINEX) 12 hr tablet 600 mg 600 mg Every 12 Hours Scheduled 6/30/2019     Sig - Route: Take 1 tablet by mouth Every 12 (Twelve) Hours. - Oral    HYDROmorphone (DILAUDID) injection solution 1 mg 1 mg Once 7/1/2019 7/1/2019    Sig - Route: Infuse 1 mL into a venous catheter 1 (One) Time. - Intravenous    ipratropium-albuterol (DUO-NEB) nebulizer solution 3 mL 3 mL 4 Times Daily - RT 6/30/2019     Sig - Route: Take 3 mL by nebulization 4 (Four) Times a Day. - Nebulization    lactated ringers infusion 75 mL/hr Continuous 6/30/2019     Sig - Route: Infuse 75 mL/hr into a venous catheter Continuous. - Intravenous    levothyroxine (SYNTHROID, LEVOTHROID) tablet 200 mcg 200 mcg Every Early Morning 7/1/2019     Sig - Route: Take 1 tablet by mouth Every Morning. - Oral    levothyroxine (SYNTHROID, LEVOTHROID) tablet 25 mcg 25 mcg Every Early Morning 7/1/2019     Sig - Route: Take 1 tablet by mouth Every Morning. - Oral    methylPREDNISolone sodium succinate (SOLU-Medrol) injection 125 mg 125 mg Once 6/30/2019 6/30/2019    Sig - Route: Infuse 2 mL into a venous catheter 1 (One) Time. - Intravenous    Cosign for Ordering: Accepted by Fern Cheek MD on 6/30/2019  9:44 AM    methylPREDNISolone sodium  succinate (SOLU-Medrol) injection 40 mg 40 mg Every 6 Hours 6/30/2019     Sig - Route: Infuse 1 mL into a venous catheter Every 6 (Six) Hours. - Intravenous    Morphine (MS CONTIN) 12 hr tablet 15 mg 15 mg 2 Times Daily PRN 6/30/2019     Sig - Route: Take 1 tablet by mouth 2 (Two) Times a Day As Needed for Severe Pain . - Oral    ondansetron (ZOFRAN) injection 4 mg 4 mg Every 6 Hours PRN 6/30/2019     Sig - Route: Infuse 2 mL into a venous catheter Every 6 (Six) Hours As Needed for Nausea or Vomiting. - Intravenous    perflutren (DEFINITY) lipid microspheres injection 8.476 mg 1.3 mL Once 6/30/2019 6/30/2019    Sig - Route: Infuse 1.3 mL into a venous catheter 1 (One) Time. - Intravenous    piperacillin-tazobactam (ZOSYN) 3.375 g/100 mL 0.9% NS IVPB (mbp) 3.375 g Once 6/30/2019 6/30/2019    Sig - Route: Infuse 100 mL into a venous catheter 1 (One) Time. - Intravenous    Cosign for Ordering: Accepted by Fern Cheek MD on 6/30/2019  9:44 AM    piperacillin-tazobactam (ZOSYN) 4.5 g in iso-osmotic dextrose 100 mL IVPB (premix) 4.5 g Every 8 Hours 6/30/2019 7/7/2019    Sig - Route: Infuse 100 mL into a venous catheter Every 8 (Eight) Hours. - Intravenous    sodium chloride 0.9 % bolus 1,578 mL 30 mL/kg × 52.6 kg Once 6/30/2019 6/30/2019    Sig - Route: Infuse 1,578 mL into a venous catheter 1 (One) Time. - Intravenous    Cosign for Ordering: Accepted by Fern Cheek MD on 6/30/2019  9:44 AM    sodium chloride 0.9 % flush 3 mL 3 mL Every 12 Hours Scheduled 6/30/2019     Sig - Route: Infuse 3 mL into a venous catheter Every 12 (Twelve) Hours. - Intravenous    sodium chloride 0.9 % flush 3-10 mL 3-10 mL As Needed 6/30/2019     Sig - Route: Infuse 3-10 mL into a venous catheter As Needed for Line Care. - Intravenous    vancomycin 1 g/250 mL 0.9% NS (vial-mate) 20 mg/kg × 52.6 kg Once 6/30/2019 6/30/2019    Sig - Route: Infuse 250 mL into a venous catheter 1 (One) Time. - Intravenous    Cosign for Ordering:  Accepted by Fern Cheek MD on 6/30/2019  9:44 AM    vancomycin 500 mg/100 mL 0.9% NS IVPB ([facility]) 500 mg Every 12 Hours 7/1/2019 7/8/2019    Sig - Route: Infuse 100 mL into a venous catheter Every 12 (Twelve) Hours. - Intravenous    piperacillin-tazobactam (ZOSYN) 3.375 g in iso-osmotic dextrose 50 ml (premix) (Discontinued) 3.375 g Every 8 Hours 6/30/2019 6/30/2019    Sig - Route: Infuse 50 mL into a venous catheter Every 8 (Eight) Hours. - Intravenous    Reason for Discontinue: Reorder    sodium chloride 0.9 % bolus 1,000 mL (Discontinued) 1,000 mL Once 6/30/2019 6/30/2019    Sig - Route: Infuse 1,000 mL into a venous catheter 1 (One) Time. - Intravenous    Cosign for Ordering: Accepted by Fern Cheek MD on 6/30/2019  9:44 AM    sodium chloride 0.9 % infusion (Discontinued) 125 mL/hr Continuous 6/30/2019 6/30/2019    Sig - Route: Infuse 125 mL/hr into a venous catheter Continuous. - Intravenous    Cosign for Ordering: Accepted by Fern Cheek MD on 6/30/2019  9:44 AM    vancomycin (VANCOCIN) in iso-osmotic dextrose IVPB 1 g (premix) 200 mL (Discontinued) 1,000 mg Every 12 Hours 6/30/2019 6/30/2019    Sig - Route: Infuse 200 mL into a venous catheter Every 12 (Twelve) Hours. - Intravenous    Reason for Discontinue: Reorder      Meds Comments as of 6/30/2019                       Lab Results (last 48 hours)     Procedure Component Value Units Date/Time    TSH [[id number]]  (Abnormal) Collected:  07/01/19 0603    Specimen:  Blood Updated:  07/01/19 0708     TSH 0.180 mIU/mL     Comprehensive Metabolic Panel [[id number]]  (Abnormal) Collected:  07/01/19 0603    Specimen:  Blood Updated:  07/01/19 0650     Glucose 122 mg/dL      BUN 4 mg/dL      Creatinine 0.24 mg/dL      Sodium 133 mmol/L      Potassium 3.5 mmol/L      Chloride 99 mmol/L      CO2 28.0 mmol/L      Calcium 7.5 mg/dL      Total Protein 5.3 g/dL      Albumin 2.60 g/dL      ALT (SGPT) 29 U/L      AST (SGOT) 55 U/L      Alkaline Phosphatase 478  U/L      Total Bilirubin 0.5 mg/dL      eGFR Non African Amer >150 mL/min/1.73      Globulin 2.7 gm/dL      A/G Ratio 1.0 g/dL      BUN/Creatinine Ratio 16.7     Anion Gap 6.0 mmol/L     Narrative:       GFR Normal >60  Chronic Kidney Disease <60  Kidney Failure <15    Lipid Panel [[id number]]  (Abnormal) Collected:  07/01/19 0603    Specimen:  Blood Updated:  07/01/19 0649     Total Cholesterol 104 mg/dL      Triglycerides 114 mg/dL      HDL Cholesterol 39 mg/dL      LDL Cholesterol  58 mg/dL      LDL/HDL Ratio 1.08    Lipase [[id number]]  (Abnormal) Collected:  07/01/19 0603    Specimen:  Blood Updated:  07/01/19 0639     Lipase 1,151 U/L     Copper, Serum [[id number]] Collected:  07/01/19 0603    Specimen:  Blood Updated:  07/01/19 0633    Immunoglobulins A/E/G/M Serum [[id number]] Collected:  07/01/19 0604    Specimen:  Blood Updated:  07/01/19 0633    Beta 2 Microglobulin, Serum [[id number]] Collected:  07/01/19 0604    Specimen:  Blood Updated:  07/01/19 0632    JONATHAN,PE and FLC, Serum [[id number]] Collected:  07/01/19 0604    Specimen:  Blood Updated:  07/01/19 0632    CBC Auto Differential [[id number]]  (Abnormal) Collected:  07/01/19 0603    Specimen:  Blood Updated:  07/01/19 0631     WBC 9.59 10*3/mm3      RBC 3.87 10*6/mm3      Hemoglobin 9.4 g/dL      Hematocrit 29.7 %      MCV 76.7 fL      MCH 24.3 pg      MCHC 31.6 g/dL      RDW 20.8 %      RDW-SD 56.8 fl      MPV -- fL      Comment: Unable to calculate        Platelets 122 10*3/mm3     Manual Differential [[id number]] Collected:  07/01/19 0603    Specimen:  Blood Updated:  07/01/19 0628    Hemoglobin A1c [[id number]] Collected:  07/01/19 0603    Specimen:  Blood Updated:  07/01/19 0618    Vitamin B12 [[id number]]  (Normal) Collected:  06/30/19 0941    Specimen:  Blood Updated:  06/30/19 1913     Vitamin B-12 503 pg/mL     Folate [[id number]]  (Normal) Collected:  06/30/19 0941    Specimen:  Blood Updated:  06/30/19 1913     Folate 8.39 ng/mL     Ferritin  [[id number]]  (Normal) Collected:  06/30/19 0941    Specimen:  Blood Updated:  06/30/19 1842     Ferritin 101.00 ng/mL     Iron Profile [[id number]]  (Abnormal) Collected:  06/30/19 0941    Specimen:  Blood Updated:  06/30/19 1813     Iron 67 mcg/dL      TIBC 224 mcg/dL      Iron Saturation 30 %     Peripheral Blood Smear [[id number]] Collected:  06/30/19 0831    Specimen:  Blood Updated:  06/30/19 1759    Troponin [[id number]]  (Normal) Collected:  06/30/19 1530    Specimen:  Blood Updated:  06/30/19 1622     Troponin I 0.029 ng/mL     Troponin [[id number]]  (Abnormal) Collected:  06/30/19 1132    Specimen:  Blood Updated:  06/30/19 1229     Troponin I 0.037 ng/mL     [facility] [place] [facility] [[id number]] Collected:  06/30/19 0941    Specimen:  Blood Updated:  06/30/19 1045    Narrative:       The following orders were created for panel order [facility].  Procedure                               Abnormality         Status                     ---------                               -----------         ------                     Red Top[[id number]]                                          Final result                 Please view results for these tests on the individual orders.    Red Top [[id number]] Collected:  06/30/19 0941    Specimen:  Blood Updated:  06/30/19 1045     Extra Tube Hold for add-ons.     Comment: Auto resulted.       Protime-INR [[id number]]  (Normal) Collected:  06/30/19 0941    Specimen:  Blood Updated:  06/30/19 0957     Protime 14.0 Seconds      INR 1.05    aPTT [[id number]]  (Abnormal) Collected:  06/30/19 0941    Specimen:  Blood Updated:  06/30/19 0957     PTT 35.8 seconds     Manual Differential [[id number]]  (Abnormal) Collected:  06/30/19 0831    Specimen:  Blood Updated:  06/30/19 0931     Neutrophil % 74.0 %      Lymphocyte % 13.0 %      Monocyte % 3.0 %      Eosinophil % 4.0 %      Bands %  4.0 %      Metamyelocyte % 2.0 %      Neutrophils Absolute 5.43 10*3/mm3      Lymphocytes Absolute 0.90 10*3/mm3       Monocytes Absolute 0.21 10*3/mm3      Eosinophils Absolute 0.28 10*3/mm3      nRBC 1.0 /100 WBC      Acanthocytes Slight/1+     Anisocytosis Slight/1+     Kaylan Cells Slight/1+     Microcytes Slight/1+     Ovalocytes Slight/1+     Poikilocytes Large/3+     Polychromasia Slight/1+     Schistocytes Slight/1+     WBC Morphology Normal     Giant Platelets Slight/1+    CBC & Differential [[id number]] Collected:  06/30/19 0831    Specimen:  Blood Updated:  06/30/19 0931    Narrative:       The following orders were created for panel order CBC & Differential.  Procedure                               Abnormality         Status                     ---------                               -----------         ------                     CBC Auto Differential[[id number]]        Abnormal            Final result                 Please view results for these tests on the individual orders.    CBC Auto Differential [[id number]]  (Abnormal) Collected:  06/30/19 0831    Specimen:  Blood Updated:  06/30/19 0931     WBC 6.96 10*3/mm3      RBC 3.95 10*6/mm3      Hemoglobin 9.5 g/dL      Hematocrit 30.5 %      MCV 77.2 fL      MCH 24.1 pg      MCHC 31.1 g/dL      RDW 20.9 %      RDW-SD 57.4 fl      MPV 10.1 fL      Platelets 139 10*3/mm3     Narrative:       ckd    Blood Culture - Blood, Arm, Left [[id number]] Collected:  06/30/19 0829    Specimen:  Blood from Arm, Left Updated:  06/30/19 0928    Blood Culture - Blood, Arm, Right [[id number]] Collected:  06/30/19 0840    Specimen:  Blood from Arm, Right Updated:  06/30/19 0928    Urinalysis With Culture If Indicated - Urine, Catheter In/Out [[id number]]  (Abnormal) Collected:  06/30/19 0847    Specimen:  Urine, Catheter In/Out Updated:  06/30/19 0917     Color, UA Dark Yellow     Appearance, UA Clear     pH, UA 6.0     Specific Gravity, UA 1.023     Glucose, UA Negative     Ketones, UA 80 mg/dL (3+)     Bilirubin, UA Moderate (2+)     Blood, UA Negative     Protein, UA 30 mg/dL (1+)     Leuk  Esterase, UA Negative     Nitrite, UA Negative     Urobilinogen, UA 2.0 E.U./dL    Urinalysis, Microscopic Only - Urine, Catheter In/Out [[id number]]  (Abnormal) Collected:  06/30/19 0847    Specimen:  Urine, Catheter In/Out Updated:  06/30/19 0917     RBC, UA None Seen /HPF      WBC, UA 0-2 /HPF      Bacteria, UA None Seen /HPF      Squamous Epithelial Cells, UA 0-2 /HPF      Hyaline Casts, UA None Seen /LPF      Methodology Manual Light Microscopy    Blood Gas, Arterial [[id number]]  (Abnormal) Collected:  06/30/19 0905    Specimen:  Arterial Blood Updated:  06/30/19 0916     Site Left Brachial     [place]'s Test N/A     pH, Arterial 7.434 pH units      pCO2, Arterial 40.3 mm Hg      pO2, Arterial 79.9 mm Hg      Comment: 84 Value below reference range        HCO3, Arterial 27.0 mmol/L      Comment: 83 Value above reference range        Base Excess, Arterial 2.5 mmol/L      Comment: 83 Value above reference range        O2 Saturation, Arterial 94.4 %      Temperature 37.0 C      Barometric Pressure for Blood Gas 753 mmHg      Modality Room Air     Flow Rate 2.0 lpm      Ventilator Mode NA     Collected by [id number]     Comment: Meter: [id number]     :  [id number]       Procalcitonin [[id number]]  (Abnormal) Collected:  06/30/19 0831    Specimen:  Blood Updated:  06/30/19 0908     Procalcitonin 0.59 ng/mL     Narrative:       SIRS, sepsis, severe sepsis, and septic shock are categorized according to the criteria of the consensus conference of the [employer].    PCT < 0.5 ng/mL     Systemic infection (sepsis) is not likely.    PCT >0.5 and < 2.0 ng/mL Systemic infection (sepsis) is possible, but other conditions are known to elevate PCT as well.    PCT > 2.0 ng/mL     Systemic infection (sepsis) is likely, unless other causes are known.      PCT > 10.0 ng/mL    Important systemic inflammatory response, almost exclusively due to severe bacterial sepsis  or septic shock.    PCT values of < 0.5 ng/mL do not exclude an infection, because localized infections (without systemic signs) may be associated with such low concentrations, or a systemic infection in its initial stages (<6 hours).  Increased PCT can occur without infection.  PCT concentrations between 0.5 and 2.0 ng/mL should be interpreted taking into account the patients history.  It is recommended to retest PCT within 6-24 hours if any concentrations < 2.0 ng/mL are obtained.    Ammonia [[id number]]  (Normal) Collected:  06/30/19 0831    Specimen:  Blood Updated:  06/30/19 0904     Ammonia 13 umol/L     Troponin [[id number]]  (Abnormal) Collected:  06/30/19 0831    Specimen:  Blood Updated:  06/30/19 0902     Troponin I 0.046 ng/mL     BNP [[id number]]  (Abnormal) Collected:  06/30/19 0831    Specimen:  Blood Updated:  06/30/19 0902     proBNP 2,580.0 pg/mL     Urine Culture - Urine, Urine, Catheter In/Out [[id number]] Collected:  06/30/19 0847    Specimen:  Urine, Catheter In/Out Updated:  06/30/19 0853    Lactic Acid, Plasma [[id number]]  (Normal) Collected:  06/30/19 0831    Specimen:  Blood Updated:  06/30/19 0851     Lactate 1.2 mmol/L     Lipase [[id number]]  (Abnormal) Collected:  06/30/19 0831    Specimen:  Blood Updated:  06/30/19 0851     Lipase 762 U/L     Amylase [[id number]]  (Abnormal) Collected:  06/30/19 0831    Specimen:  Blood Updated:  06/30/19 0851     Amylase 147 U/L     Comprehensive Metabolic Panel [[id number]]  (Abnormal) Collected:  06/30/19 0831    Specimen:  Blood Updated:  06/30/19 0851     Glucose 82 mg/dL      BUN 8 mg/dL      Creatinine 0.35 mg/dL      Sodium 134 mmol/L      Potassium 3.7 mmol/L      Chloride 98 mmol/L      CO2 29.0 mmol/L      Calcium 8.3 mg/dL      Total Protein 6.1 g/dL      Albumin 3.10 g/dL      ALT (SGPT) 32 U/L      AST (SGOT) 73 U/L      Alkaline Phosphatase 599 U/L      Total Bilirubin 0.7 mg/dL      eGFR Non African Amer >150 mL/min/1.73      Globulin 3.0  gm/dL      A/G Ratio 1.0 g/dL      BUN/Creatinine Ratio 22.9     Anion Gap 7.0 mmol/L     Narrative:       GFR Normal >60  Chronic Kidney Disease <60  Kidney Failure <15          Lab Results (last 48 hours)     Procedure Component Value Units Date/Time    TSH [[id number]]  (Abnormal) Collected:  07/01/19 0603    Specimen:  Blood Updated:  07/01/19 0708     TSH 0.180 mIU/mL     Comprehensive Metabolic Panel [[id number]]  (Abnormal) Collected:  07/01/19 0603    Specimen:  Blood Updated:  07/01/19 0650     Glucose 122 mg/dL      BUN 4 mg/dL      Creatinine 0.24 mg/dL      Sodium 133 mmol/L      Potassium 3.5 mmol/L      Chloride 99 mmol/L      CO2 28.0 mmol/L      Calcium 7.5 mg/dL      Total Protein 5.3 g/dL      Albumin 2.60 g/dL      ALT (SGPT) 29 U/L      AST (SGOT) 55 U/L      Alkaline Phosphatase 478 U/L      Total Bilirubin 0.5 mg/dL      eGFR Non African Amer >150 mL/min/1.73      Globulin 2.7 gm/dL      A/G Ratio 1.0 g/dL      BUN/Creatinine Ratio 16.7     Anion Gap 6.0 mmol/L     Narrative:       GFR Normal >60  Chronic Kidney Disease <60  Kidney Failure <15    Lipid Panel [[id number]]  (Abnormal) Collected:  07/01/19 0603    Specimen:  Blood Updated:  07/01/19 0649     Total Cholesterol 104 mg/dL      Triglycerides 114 mg/dL      HDL Cholesterol 39 mg/dL      LDL Cholesterol  58 mg/dL      LDL/HDL Ratio 1.08    Lipase [[id number]]  (Abnormal) Collected:  07/01/19 0603    Specimen:  Blood Updated:  07/01/19 0639     Lipase 1,151 U/L     Copper, Serum [[id number]] Collected:  07/01/19 0603    Specimen:  Blood Updated:  07/01/19 0633    Immunoglobulins A/E/G/M Serum [[id number]] Collected:  07/01/19 0604    Specimen:  Blood Updated:  07/01/19 0633    Beta 2 Microglobulin, Serum [[id number]] Collected:  07/01/19 0604    Specimen:  Blood Updated:  07/01/19 0632    JONATHAN,PE and FLC, Serum [[id number]] Collected:  07/01/19 0604    Specimen:  Blood Updated:  07/01/19 0632    CBC Auto Differential [[id number]]  (Abnormal)  Collected:  07/01/19 0603    Specimen:  Blood Updated:  07/01/19 0631     WBC 9.59 10*3/mm3      RBC 3.87 10*6/mm3      Hemoglobin 9.4 g/dL      Hematocrit 29.7 %      MCV 76.7 fL      MCH 24.3 pg      MCHC 31.6 g/dL      RDW 20.8 %      RDW-SD 56.8 fl      MPV -- fL      Comment: Unable to calculate        Platelets 122 10*3/mm3     Manual Differential [[id number]] Collected:  07/01/19 0603    Specimen:  Blood Updated:  07/01/19 0628    Hemoglobin A1c [[id number]] Collected:  07/01/19 0603    Specimen:  Blood Updated:  07/01/19 0618    Vitamin B12 [[id number]]  (Normal) Collected:  06/30/19 0941    Specimen:  Blood Updated:  06/30/19 1913     Vitamin B-12 503 pg/mL     Folate [[id number]]  (Normal) Collected:  06/30/19 0941    Specimen:  Blood Updated:  06/30/19 1913     Folate 8.39 ng/mL     Ferritin [[id number]]  (Normal) Collected:  06/30/19 0941    Specimen:  Blood Updated:  06/30/19 1842     Ferritin 101.00 ng/mL     Iron Profile [[id number]]  (Abnormal) Collected:  06/30/19 0941    Specimen:  Blood Updated:  06/30/19 1813     Iron 67 mcg/dL      TIBC 224 mcg/dL      Iron Saturation 30 %     Peripheral Blood Smear [[id number]] Collected:  06/30/19 0831    Specimen:  Blood Updated:  06/30/19 1759    Troponin [[id number]]  (Normal) Collected:  06/30/19 1530    Specimen:  Blood Updated:  06/30/19 1622     Troponin I 0.029 ng/mL     Troponin [[id number]]  (Abnormal) Collected:  06/30/19 1132    Specimen:  Blood Updated:  06/30/19 1229     Troponin I 0.037 ng/mL     [place] Draw [[id number]] Collected:  06/30/19 0941    Specimen:  Blood Updated:  06/30/19 1045    Narrative:       The following orders were created for panel order [place] Draw.  Procedure                               Abnormality         Status                     ---------                               -----------         ------                     Red Top[[id number]]                                          Final result                 Please view results for  these tests on the individual orders.    Red Top [[id number]] Collected:  06/30/19 0941    Specimen:  Blood Updated:  06/30/19 1045     Extra Tube Hold for add-ons.     Comment: Auto resulted.       Protime-INR [[id number]]  (Normal) Collected:  06/30/19 0941    Specimen:  Blood Updated:  06/30/19 0957     Protime 14.0 Seconds      INR 1.05    aPTT [[id number]]  (Abnormal) Collected:  06/30/19 0941    Specimen:  Blood Updated:  06/30/19 0957     PTT 35.8 seconds     Manual Differential [[id number]]  (Abnormal) Collected:  06/30/19 0831    Specimen:  Blood Updated:  06/30/19 0931     Neutrophil % 74.0 %      Lymphocyte % 13.0 %      Monocyte % 3.0 %      Eosinophil % 4.0 %      Bands %  4.0 %      Metamyelocyte % 2.0 %      Neutrophils Absolute 5.43 10*3/mm3      Lymphocytes Absolute 0.90 10*3/mm3      Monocytes Absolute 0.21 10*3/mm3      Eosinophils Absolute 0.28 10*3/mm3      nRBC 1.0 /100 WBC      Acanthocytes Slight/1+     Anisocytosis Slight/1+     Kaylan Cells Slight/1+     Microcytes Slight/1+     Ovalocytes Slight/1+     Poikilocytes Large/3+     Polychromasia Slight/1+     Schistocytes Slight/1+     WBC Morphology Normal     Giant Platelets Slight/1+    CBC & Differential [[id number]] Collected:  06/30/19 0831    Specimen:  Blood Updated:  06/30/19 0931    Narrative:       The following orders were created for panel order CBC & Differential.  Procedure                               Abnormality         Status                     ---------                               -----------         ------                     CBC Auto Differential[[id number]]        Abnormal            Final result                 Please view results for these tests on the individual orders.    CBC Auto Differential [[id number]]  (Abnormal) Collected:  06/30/19 0831    Specimen:  Blood Updated:  06/30/19 0931     WBC 6.96 10*3/mm3      RBC 3.95 10*6/mm3      Hemoglobin 9.5 g/dL      Hematocrit 30.5 %      MCV 77.2 fL      MCH 24.1 pg      MCHC  31.1 g/dL      RDW 20.9 %      RDW-SD 57.4 fl      MPV 10.1 fL      Platelets 139 10*3/mm3     Narrative:       ckd    Blood Culture - Blood, Arm, Left [[id number]] Collected:  06/30/19 0829    Specimen:  Blood from Arm, Left Updated:  06/30/19 0928    Blood Culture - Blood, Arm, Right [[id number]] Collected:  06/30/19 0840    Specimen:  Blood from Arm, Right Updated:  06/30/19 0928    Urinalysis With Culture If Indicated - Urine, Catheter In/Out [[id number]]  (Abnormal) Collected:  06/30/19 0847    Specimen:  Urine, Catheter In/Out Updated:  06/30/19 0917     Color, UA Dark Yellow     Appearance, UA Clear     pH, UA 6.0     Specific Gravity, UA 1.023     Glucose, UA Negative     Ketones, UA 80 mg/dL (3+)     Bilirubin, UA Moderate (2+)     Blood, UA Negative     Protein, UA 30 mg/dL (1+)     Leuk Esterase, UA Negative     Nitrite, UA Negative     Urobilinogen, UA 2.0 E.U./dL    Urinalysis, Microscopic Only - Urine, Catheter In/Out [[id number]]  (Abnormal) Collected:  06/30/19 0847    Specimen:  Urine, Catheter In/Out Updated:  06/30/19 0917     RBC, UA None Seen /HPF      WBC, UA 0-2 /HPF      Bacteria, UA None Seen /HPF      Squamous Epithelial Cells, UA 0-2 /HPF      Hyaline Casts, UA None Seen /LPF      Methodology Manual Light Microscopy    Blood Gas, Arterial [[id number]]  (Abnormal) Collected:  06/30/19 0905    Specimen:  Arterial Blood Updated:  06/30/19 0916     Site Left Brachial     [place]'s Test N/A     pH, Arterial 7.434 pH units      pCO2, Arterial 40.3 mm Hg      pO2, Arterial 79.9 mm Hg      Comment: 84 Value below reference range        HCO3, Arterial 27.0 mmol/L      Comment: 83 Value above reference range        Base Excess, Arterial 2.5 mmol/L      Comment: 83 Value above reference range        O2 Saturation, Arterial 94.4 %      Temperature 37.0 C      Barometric Pressure for Blood Gas 753 mmHg      Modality Room Air     Flow Rate 2.0 lpm      Ventilator Mode NA     Collected by [id number]     Comment:  Meter: [id number]     :  [id number]       Procalcitonin [[id number]]  (Abnormal) Collected:  06/30/19 0831    Specimen:  Blood Updated:  06/30/19 0908     Procalcitonin 0.59 ng/mL     Narrative:       SIRS, sepsis, severe sepsis, and septic shock are categorized according to the criteria of the consensus conference of the American [employer] Chest [employer]icians/Society of Critical Care Medicine.    PCT < 0.5 ng/mL     Systemic infection (sepsis) is not likely.    PCT >0.5 and < 2.0 ng/mL Systemic infection (sepsis) is possible, but other conditions are known to elevate PCT as well.    PCT > 2.0 ng/mL     Systemic infection (sepsis) is likely, unless other causes are known.      PCT > 10.0 ng/mL    Important systemic inflammatory response, almost exclusively due to severe bacterial sepsis or septic shock.    PCT values of < 0.5 ng/mL do not exclude an infection, because localized infections (without systemic signs) may be associated with such low concentrations, or a systemic infection in its initial stages (<6 hours).  Increased PCT can occur without infection.  PCT concentrations between 0.5 and 2.0 ng/mL should be interpreted taking into account the patients history.  It is recommended to retest PCT within 6-24 hours if any concentrations < 2.0 ng/mL are obtained.    Ammonia [[id number]]  (Normal) Collected:  06/30/19 0831    Specimen:  Blood Updated:  06/30/19 0904     Ammonia 13 umol/L     Troponin [[id number]]  (Abnormal) Collected:  06/30/19 0831    Specimen:  Blood Updated:  06/30/19 0902     Troponin I 0.046 ng/mL     BNP [[id number]]  (Abnormal) Collected:  06/30/19 0831    Specimen:  Blood Updated:  06/30/19 0902     proBNP 2,580.0 pg/mL     Urine Culture - Urine, Urine, Catheter In/Out [[id number]] Collected:  06/30/19 0847    Specimen:  Urine, Catheter In/Out Updated:  06/30/19 0853    Lactic Acid, Plasma [[id number]]  (Normal) Collected:  06/30/19 0831    Specimen:  Blood Updated:  06/30/19 0851      Lactate 1.2 mmol/L     Lipase [[id number]]  (Abnormal) Collected:  06/30/19 0831    Specimen:  Blood Updated:  06/30/19 0851     Lipase 762 U/L     Amylase [[id number]]  (Abnormal) Collected:  06/30/19 0831    Specimen:  Blood Updated:  06/30/19 0851     Amylase 147 U/L     Comprehensive Metabolic Panel [[id number]]  (Abnormal) Collected:  06/30/19 0831    Specimen:  Blood Updated:  06/30/19 0851     Glucose 82 mg/dL      BUN 8 mg/dL      Creatinine 0.35 mg/dL      Sodium 134 mmol/L      Potassium 3.7 mmol/L      Chloride 98 mmol/L      CO2 29.0 mmol/L      Calcium 8.3 mg/dL      Total Protein 6.1 g/dL      Albumin 3.10 g/dL      ALT (SGPT) 32 U/L      AST (SGOT) 73 U/L      Alkaline Phosphatase 599 U/L      Total Bilirubin 0.7 mg/dL      eGFR Non African Amer >150 mL/min/1.73      Globulin 3.0 gm/dL      A/G Ratio 1.0 g/dL      BUN/Creatinine Ratio 22.9     Anion Gap 7.0 mmol/L     Narrative:       GFR Normal >60  Chronic Kidney Disease <60  Kidney Failure <15        Orders (last 24 hrs)     Start     Ordered    07/02/19 0300  Vancomycin, Trough  Timed      06/30/19 1354    07/01/19 0632  Code Status and Medical Interventions:  Continuous      07/01/19 0632    07/01/19 0631  Inpatient Palliative Care Consult  Once     Provider:  (Not yet assigned)    07/01/19 0632    07/01/19 0629  Comprehensive Metabolic Panel  Morning Draw      06/30/19 1242    07/01/19 0629  Lipase  Morning Draw      06/30/19 1242    07/01/19 0629  Lipid Panel  Morning Draw      06/30/19 1242    07/01/19 0629  Manual Differential  Once      07/01/19 0628    07/01/19 0600  levothyroxine (SYNTHROID, LEVOTHROID) tablet 200 mcg  Every Early Morning      06/30/19 1242    07/01/19 0600  levothyroxine (SYNTHROID, LEVOTHROID) tablet 25 mcg  Every Early Morning      06/30/19 1242    07/01/19 0600  CBC Auto Differential  Morning Draw      06/30/19 1242    07/01/19 0600  Hemoglobin A1c  Morning Draw      06/30/19 1242    07/01/19 0600  TSH  Morning Draw       06/30/19 1242    07/01/19 0600  Beta 2 Microglobulin, Serum  Morning Draw      06/30/19 1726    07/01/19 0600  JONATHAN,PE and FLC, Serum  Morning Draw      06/30/19 1726    07/01/19 0600  Immunoglobulins A/E/G/M Serum  Morning Draw      06/30/19 1726    07/01/19 0600  Copper, Serum  Morning Draw      06/30/19 1727    07/01/19 0400  vancomycin 500 mg/100 mL 0.9% NS IVPB ([facility])  Every 12 Hours      06/30/19 1256    07/01/19 0145  HYDROmorphone (DILAUDID) injection solution 1 mg  Once      07/01/19 0059    06/30/19 2100  atorvastatin (LIPITOR) tablet 40 mg  Nightly      06/30/19 1242    06/30/19 2100  escitalopram (LEXAPRO) tablet 20 mg  Nightly      06/30/19 1242    06/30/19 1948  Inpatient Case Management  Consult  Once     Provider:  (Not yet assigned)    06/30/19 1947    06/30/19 1800  perflutren (DEFINITY) lipid microspheres injection 8.476 mg  Once      06/30/19 1706    06/30/19 1727  Peripheral Blood Smear  Once      06/30/19 1726    06/30/19 1726  Iron Profile  Once      06/30/19 1725    06/30/19 1726  Ferritin  Once      06/30/19 1725    06/30/19 1726  Vitamin B12  Once      06/30/19 1725    06/30/19 1726  Folate  Once      06/30/19 1725    06/30/19 1600  Vital Signs  Every 4 Hours      06/30/19 1242    06/30/19 1530  ipratropium-albuterol (DUO-NEB) nebulizer solution 3 mL  4 Times Daily - RT      06/30/19 1242    06/30/19 1500  methylPREDNISolone sodium succinate (SOLU-Medrol) injection 40 mg  Every 6 Hours      06/30/19 1242    06/30/19 1500  Morphine (MS CONTIN) 12 hr tablet 15 mg  2 Times Daily PRN      06/30/19 1242    06/30/19 1500  piperacillin-tazobactam (ZOSYN) 4.5 g in iso-osmotic dextrose 100 mL IVPB (premix)  Every 8 Hours      06/30/19 1253    06/30/19 1400  enoxaparin (LOVENOX) syringe 30 mg  Every 24 Hours      06/30/19 1242    06/30/19 1330  piperacillin-tazobactam (ZOSYN) 3.375 g in iso-osmotic dextrose 50 ml (premix)  Every 8 Hours,   Status:  Discontinued      06/30/19 1242     19 1330  vancomycin (VANCOCIN) in iso-osmotic dextrose IVPB 1 g (premix) 200 mL  Every 12 Hours,   Status:  Discontinued      19 1242    19 1330  lactated ringers infusion  Continuous      19 1242    19 1330  aspirin chewable tablet 162 mg  Daily      19 1242    19 1330  famotidine (PEPCID) injection 20 mg  2 Times Daily      19 1242    19 1330  guaiFENesin (MUCINEX) 12 hr tablet 600 mg  Every 12 Hours Scheduled      19 1242    19 1330  sodium chloride 0.9 % flush 3 mL  Every 12 Hours Scheduled      19 1242    19 1309  SLP Plan of Care Cert / Re-Cert  Once     Comments:  Speech Language Pathology Plan of Care  Initial Certification  Certification Period: 2019 - 2019    Patient Name: Jud Dhillon  : 1965    (R62.7) Failure to thrive in adult  (primary encounter diagnosis)  (J18.1) Pneumonia of right middle lobe due to infectious organism (CMS/HCC)  (R09.02) Hypoxia  (K76.9) Liver lesion  (K85.00) Idiopathic acute pancreatitis, unspecified complication status  (R79.89) Elevated brain natriuretic peptide (BNP) level  (R74.8) Elevated troponin  (D64.9) Anemia, unspecified type  (R74.8) Elevated alkaline phosphatase level  (R13.10) Dysphagia, unspecified type                Assessment  SLP Assessment  Prior Level of Function-Communication: WFL  Prior Level of Function-Swallowing: no diet consistency restrictions  SLP Swallowing Diagnosis: functional oral phase, mild-moderate, suspected pharyngeal dysfunction  Plan of Care Reviewed With: patient, family  Swallow Criteria for Skilled Therapeutic Interventions Met: demonstrates skilled criteria      IP SLP Goals     Row Name 19 1208             Oral Nutrition/Hydration Goal 1 (SLP)    Oral Nutrition/Hydration Goal 1, SLP  Pt will tolerate least restrictive   diet with no overt s/s of aspiration.   -MB      Time Frame (Oral Nutrition/Hydration Goal 1, SLP)  by discharge  -MB       Barriers (Oral Nutrition/Hydration Goal 1, SLP)  n/a  -MB      Progress/Outcomes (Oral Nutrition/Hydration Goal 1, SLP)  goal ongoing    -MB         Pharyngeal Strengthening Exercise Goal 1 (SLP)    Activity (Pharyngeal Strengthening Goal 1, SLP)  increase superior   movement of the hyolaryngeal complex;increase squeeze/positive pressure   generation  -MB      Increase Superior Movement of the Hyolaryngeal Complex  [place]  -MB        Increase Squeeze/Positive Pressure Generation  hard effortful swallow    -MB      [place]/Accuracy (Pharyngeal Strengthening Goal 1, SLP)    independently (over 90% accuracy)  -MB      Time Frame (Pharyngeal Strengthening Goal 1, SLP)  short term goal   (STG);by discharge  -MB      Barriers (Pharyngeal Strengthening Goal 1, SLP)  n/a  -MB      Progress/Outcomes (Pharyngeal Strengthening Goal 1, SLP)  goal ongoing    -MB        User Key  (r) = Recorded By, (t) = Taken By, (c) = Cosigned By    Initials Name Provider Type    Brandon Fowler CCC-SLP Speech and Language Pathologist                  Plan    SLP Plan  Therapy Frequency (Swallow): 3 days per week  Predicted Duration Therapy Intervention (Days): until discharge        Brandon Fatima CCC-SLP  6/30/2019      By cosigning this order, either electronically or physically, I certify that the therapy services are furnished while this patient is under my care, the services outline above are required by this patient, and will be reviewed every 90 days.        M.D.:__________________________________________ Date: ______________    06/30/19 1309    06/30/19 1309  DIET MESSAGE Please send cold Boost between meals, not thickened.  Once     Comments:  Please send cold Boost between meals, not thickened.    06/30/19 1309    06/30/19 1308  Diet Regular; [place] / Syrup Thick  Diet Effective Now     Comments:  Meds whole with pudding or thickened liquids. Ok for ice chips or sips of water between meals. Pt may have cold  Boost not thickened.    06/30/19 1309    06/30/19 1300  Strict Intake & Output  Every Hour      06/30/19 1242    06/30/19 1243  Daily Weights  Daily      06/30/19 1242    06/30/19 1243  PT Consult: Eval & Treat As Tolerated; Weakness and abnormal gait.  Once      06/30/19 1242    06/30/19 1242  OT Consult: Eval & Treat Weakness abnormal gait.  Once      06/30/19 1242    06/30/19 1241  Troponin  Now Then Every 3 Hours     Comments:  If Drawn in ED, Start 6 Hours After Last Draw      06/30/19 1242    06/30/19 1240  Inpatient Hematology & Oncology Consult  Once     Specialty:  Hematology and Oncology  Provider:  Sebastian Crespo MD    06/30/19 1242    06/30/19 1240  Inpatient Palliative Care Nurse Consult  Once     Provider:  (Not yet assigned)    06/30/19 1242    06/30/19 1239  Code Status and Medical Interventions:  Continuous,   Status:  Canceled      06/30/19 1242    06/30/19 1239  Intake & Output  Every Shift      06/30/19 1242    06/30/19 1239  Weigh Patient  Once      06/30/19 1242    06/30/19 1239  Oxygen Therapy- Nasal Cannula; Titrate for SPO2: 90% - 95%  Continuous      06/30/19 1242    06/30/19 1239  Insert Peripheral IV  Once      06/30/19 1242    06/30/19 1239  Saline Lock & Maintain IV Access  Continuous      06/30/19 1242    06/30/19 1239  Cardiac Monitoring  Continuous      06/30/19 1242    06/30/19 1239  Diet Regular  Diet Effective Now,   Status:  Canceled     Comments:  Once speech clear.    06/30/19 1242    06/30/19 1238  sodium chloride 0.9 % flush 3-10 mL  As Needed      06/30/19 1242    06/30/19 1236  Adult Transthoracic Echo Complete W/ Cont if Necessary Per Protocol  Once      06/30/19 1242    06/30/19 1233  ondansetron (ZOFRAN) injection 4 mg  Every 6 Hours PRN      06/30/19 1242    06/30/19 1151  SLP Consult: Eval & Treat Other; dysphagia, Pneumonia  Once      06/30/19 1150    06/30/19 1150  Nutrition Consult  Once     Provider:  (Not yet assigned)    06/30/19 1150    06/30/19  1049  Initiate Observation Status  Once      06/30/19 1048    06/30/19 1040  fentaNYL citrate (PF) (SUBLIMAZE) injection 52.5 mcg  Once      06/30/19 1038    06/30/19 0941  [place] Draw  Once      06/30/19 0941    06/30/19 0941  Red Top  PROCEDURE ONCE      06/30/19 0941    06/30/19 0924  Protime-INR  Once      06/30/19 0923    06/30/19 0924  aPTT  Once      06/30/19 0923    06/30/19 0916  Blood Gas, Arterial  Once      06/30/19 0905    06/30/19 0906  Urinalysis, Microscopic Only - Urine, Clean Catch  Once      06/30/19 0905    06/30/19 0851  Manual Differential  Once      06/30/19 0850    06/30/19 0843  methylPREDNISolone sodium succinate (SOLU-Medrol) injection 125 mg  Once      06/30/19 0841    06/30/19 0843  albuterol (PROVENTIL) nebulizer solution 0.083% 2.5 mg/3mL  Once      06/30/19 0841    06/30/19 0841  sodium chloride 0.9 % bolus 1,578 mL  Once      06/30/19 0839    06/30/19 0841  vancomycin 1 g/250 mL 0.9% NS (vial-mate)  Once      06/30/19 0839    06/30/19 0840  piperacillin-tazobactam (ZOSYN) 3.375 g/100 mL 0.9% NS IVPB (mbp)  Once      06/30/19 0839    06/30/19 0813  sodium chloride 0.9 % infusion  Continuous,   Status:  Discontinued      06/30/19 0811    06/30/19 0813  sodium chloride 0.9 % bolus 1,000 mL  Once,   Status:  Discontinued      06/30/19 0811    06/30/19 0811  Troponin  Now Then Every 3 Hours      06/30/19 0811    06/30/19 0811  BNP  Once      06/30/19 0811    06/30/19 0811  Blood Culture - Blood,  Once      06/30/19 0811    06/30/19 0811  Blood Culture - Blood,  Once      06/30/19 0811    06/30/19 0811  Lactic Acid, Plasma  Once      06/30/19 0811    06/30/19 0811  Procalcitonin  Once      06/30/19 0811    06/30/19 0811  Pulse Oximetry, Continuous  Continuous      06/30/19 0811    06/30/19 0811  Cardiac Monitoring  Once,   Status:  Canceled      06/30/19 0811    06/30/19 0810  Urinalysis With Culture If Indicated - Urine, Clean Catch  Once      06/30/19 0811    06/30/19 0810  Urine  "Culture - Urine, Urine, Clean Catch  Once      06/30/19 0811    06/30/19 0810  Ammonia  STAT      06/30/19 0811    06/30/19 0810  ECG 12 Lead  Now & in 3 Hours      06/30/19 0811    06/30/19 0810  XR Abdomen 2 View With Chest 1 View  1 Time Imaging      06/30/19 0811    06/30/19 0809  CBC & Differential  Once      06/30/19 0811    06/30/19 0809  Comprehensive Metabolic Panel  Once      06/30/19 0811    06/30/19 0809  Blood Gas, Arterial  Once      06/30/19 0811    06/30/19 0809  Lipase  Once      06/30/19 0811    06/30/19 0809  Amylase  STAT      06/30/19 0811    06/30/19 0809  CBC Auto Differential  PROCEDURE ONCE      06/30/19 0811    06/30/19 0808  Obtain medical records  Once     Comments:  Notify physician when available. [facility] x 10 days    06/30/19 0811        Physician Progress Notes (last 24 hours) (Notes from 6/30/2019  7:18 AM through 7/1/2019  7:18 AM)      Ricardo Drew MD at 7/1/2019  6:44 AM          [facility] Progress Note    Progress Note    Patient:  Jud Dhillon  YOB: 1965  Date of Service: 7/1/2019  MRN: [id number]   Acct: [id number]   Primary Care Physician: Hernan Ybarra MD  Advance Directive:   Code Status and Medical Interventions:   Ordered at: 07/01/19 0632     Level Of Support Discussed With:    Patient     Code Status:    No CPR     Medical Interventions (Level of Support Prior to Arrest):    Comfort Measures     Admit Date: 6/30/2019       Hospital Day: 0      Subjective:     Chief Compliant: \"Weak.\"  Doing poorly.  Seen with nurse Gloria at bedside.        Review of Systems:   Review of Systems   Constitutional: Negative for chills, diaphoresis and fever.   HENT: Negative for mouth sores and nosebleeds.    Eyes: Negative for redness and visual disturbance.   Respiratory: Negative for shortness of breath and wheezing.    Cardiovascular: Negative for chest pain and leg swelling.   Gastrointestinal: Positive for abdominal pain. Negative for nausea and vomiting. " "  Genitourinary: Negative for flank pain and hematuria.   Neurological: Negative for tremors and speech difficulty.   Hematological: Does not bruise/bleed easily.   Psychiatric/Behavioral: Negative for agitation, behavioral problems, confusion and hallucinations.         Objective:   Vitals: /76 (BP Location: Left arm, Patient Position: Lying)   Pulse 94   Temp 97.6 °F (36.4 °C) (Oral)   Resp 18   Ht 160 cm (62.99\")   Wt 52.6 kg (115 lb 15.4 oz)   SpO2 98%   BMI 20.55 kg/m²    Physical Exam   Constitutional: She is oriented to person, place, and time.   Chronically ill looking.   HENT:   Head: Normocephalic and atraumatic.   Eyes: No scleral icterus.   Neck: No JVD present.   Cardiovascular: Normal rate and regular rhythm.   Pulmonary/Chest: No respiratory distress. She has no wheezes.   Abdominal: Soft. Bowel sounds are normal. There is tenderness. There is no rebound and no guarding.   Musculoskeletal: She exhibits no edema.   Neurological: She is alert and oriented to person, place, and time.   Skin: There is pallor.   Psychiatric: She has a normal mood and affect. Her behavior is normal. Judgment and thought content normal.   Nursing note and vitals reviewed.    24HR INTAKE/OUTPUT:      Intake/Output Summary (Last 24 hours) at 7/1/2019 0644  Last data filed at 7/1/2019 0608  Gross per 24 hour   Intake 2982.4 ml   Output 200 ml   Net 2782.4 ml        Problem list:       Failure to thrive in adult      Assessment/Plan:     1. Multiple liver metastases, etiology undefined.  History of thyroid cancer and carcinoid tumor, small bowel, gallbladder, large intestine, and appendix.    2. Poor performance status of 3.   3. Pneumonia.   4. Tobacco abuse  5. Anxiety  6. Depression.  7. Microcytic anemia.     PLAN:  1.   Re:  Poor overall prognosis and too ill for palliative chemotherapy.  2.   Re:  No liver biopsy due to her poor overall prognosis.  3.   Re:  Supportive care/hospice and " "code status.  She agreed for hospice and comfort care.  \"I am so weak.  I was DNR when I came here.  Thank you.\"    4.   No office visit.  Will sign off.    5.   Above discussed with nurse Gloria.  She verbalized understanding.       Electronically signed by Ricardo Drew MD at 7/1/2019  6:53 AM          Consult Notes (last 24 hours) (Notes from 6/30/2019  7:18 AM through 7/1/2019  7:18 AM)      Sebastian Crespo MD at 6/30/2019  2:48 PM      Consult Orders    1. Inpatient Hematology & Oncology Consult [[id number]] ordered by Juice Lamar MD at 06/30/19 1242                [facility]    HEMATOLOGY & ONCOLOGY      CONSULTATION NOTE      REASON FOR CONSULT: Liver lesions  REFERRING PHYSICIAN: Juice Lamar MD    ADMISSION DIAGNOSIS  Failure to thrive in adult [R62.7]      Subjective     HISTORY OF PRESENT ILLNESS:     53 yo female with h/o thyroid cancer 2007 s/p radiation was seen by Dr Drew for multiple liver lesions and was supposed to f/u at [place]. Presented to this admission due to failure to thive and oncology consult called. Pt reports not eating, feeling very weak and having more abdominal pain. Denies n/v, fever, cp, focal weakness. Rest of ros unremarkable.      General Appearance:    Alert, cooperative, no distress, appears stated age   Head:    Normocephalic, without obvious abnormality, atraumatic   Eyes:    PERRL, conjunctiva/corneas clear, EOM's intact, fundi     benign, both eyes   Ears:    Normal TM's and external ear canals, both ears   Nose:   Nares normal, septum midline, mucosa normal, no drainage     or sinus tenderness   Throat:   Lips, mucosa, and tongue normal; teeth and gums normal   Neck:   Supple, symmetrical, trachea midline, no adenopathy;     thyroid:  no enlargement/tenderness/nodules; no carotid    bruit or JVD   Back:     Symmetric, no curvature, ROM normal, no CVA tenderness   Lungs:     Clear to auscultation bilaterally, respirations unlabored   Chest " Wall:    No tenderness or deformity    Heart:    Regular rate and rhythm, S1 and S2 normal, no murmur, rub    or gallop   Abdomen:     Soft, non-tender, bowel sounds active all four quadrants,     no masses, no organomegaly   Extremities:   Extremities normal, atraumatic, no cyanosis or edema   Pulses:   2+ and symmetric all extremities   Skin:   Skin color, texture, turgor normal, no rashes or lesions   Lymph nodes:   Cervical, supraclavicular, and axillary nodes normal   Neurologic:   CNII-XII intact, normal strength, sensation and reflexes     throughout     Assessment/Plan 53 yo female with h/o thyroid ca in 2007 s/p xrt, had abdominal imaging which showed multiple liver lesions concerning for metastasis. Pt is still in the process of being workup for this. Appointment at [place] July 9th  -currently no diagnosis. Dr Drew will be back on Monday and will take over care and management. No oncologic intervention indicated currently.  - anemia : will check iron profile, ferritin, b12, folate, myeloma panel.  -surya management of all acute medical problem      Failure to thrive in adult              Time Spent: 60 minutes; greater than  50% of time was spent in patient counseling and care coordination.     Sebastian Crespo MD    6/30/2019    2:48 PM     Thank you for this consultation and opportunity to participate in this patient's care.    Electronically signed by Sebastian Crespo MD at 6/30/2019  5:24 PM

## 2019-07-01 NOTE — PLAN OF CARE
Problem: Patient Care Overview  Goal: Plan of Care Review  Outcome: Ongoing (interventions implemented as appropriate)   07/01/19 1003   Coping/Psychosocial   Plan of Care Reviewed With patient   OTHER   Outcome Summary Swallowing tx completed this AM. No family present. Pt stated she continues to have limited PO intake, though she did state she has been attempting to drink her unthickened (as cleared per SLP) Boost. Pt agreed to attempt PO trials for possible upgrade. Pt was presented multiple thin liquid coffee trials via straw. Pt spontaneously consumed small sips, as well as nonconsecutive sips. No showed no overt s/s of aspiration. Pt was educated on risks for aspiration, pneumonia from aspiration, as well as further respiratory complications including respiratory failure. Knowing risks, pt desires to upgrade to thin liquids at this time. Continue with regular solid diet consistency. SLP to d/c ST services at this time secondary to change to comfort measures. MD to reconsult if pt becomes more appropriate for skilled ST services. Thanks!    Plan of Care Review   Progress improving

## 2019-07-01 NOTE — PROGRESS NOTES
Malnutrition Severity Assessment    Patient Name:  Jud Dhillon  YOB: 1965  MRN: [id number]  Admit Date:  6/30/2019    Patient meets criteria for : Severe Malnutrition    Comments:  If in agreement with malnutrition assessment, please attest documentation. Thanks.     Malnutrition Severity Assessment  Malnutrition Type: Chronic Disease - Related Malnutrition     Malnutrition Type (last 8 hours)      Malnutrition Severity Assessment     Row Name 07/01/19 1721       Malnutrition Severity Assessment    Malnutrition Type  Chronic Disease - Related Malnutrition    Row Name 07/01/19 1721       Insufficient Energy Intake     Insufficient Energy Intake   <50% of est. energy requirement for >or equal to 1 month    Row Name 07/01/19 1721       Unintentional Weight Loss     Unintentional Weight Loss   Weight loss greater than 5% in one month 15+ lbs (11.5%)    Row Name 07/01/19 1721       Muscle Loss    Loss of Muscle Mass Findings  Severe    Yarsani Region  Moderate - slight depression    Clavicle Bone Region  Severe - protruding prominent bone    Acromion Bone Region  Severe - squared shoulders, bones, and acromion process protrusion prominent    Row Name 07/01/19 1721       Fat Loss    Subcutaneous Fat Loss Findings  Severe    Orbital Region   Moderate -  somewhat hollowness, slightly dark circles    Upper Arm Region  Severe - mostly skin, very little space between folds, fingers touch    Thoracic & Lumbar Region  Severe - ribs visible with prominent depressions, iliac crest very prominent    Row Name 07/01/19 1721       Fluid Accumulation (Edema)    Fluid Accumulation   Mild equals 1+ pitting edema    Row Name 07/01/19 1721       Criteria Met (Must meet criteria for severity in at least 2 of these categories: M Wasting, Fat Loss, Fluid, Secondary Signs, Wt. Status, Intake)    Patient meets criteria for   Severe Malnutrition          Electronically signed by:  Karen Ray RDN, LD  07/01/19 5:30 PM

## 2019-07-01 NOTE — PROGRESS NOTES
[facility] Medicine Services  INPATIENT PROGRESS NOTE    Patient Name: Jud Dhillon  Date of Admission: 6/30/2019  Today's Date: 07/01/19  Length of Stay: 0  Primary Care Physician: Hernan Ybarra MD    Subjective   Chief Complaint: Pain  HPI   Doing ok.  Has pain all over  Breathing a bit better  Afebrile        Review of Systems   Constitutional: Positive for fatigue. Negative for fever.   HENT: Negative for congestion and ear pain.    Eyes: Negative for pain and visual disturbance.   Respiratory: Positive for cough and shortness of breath. Negative for wheezing.    Cardiovascular: Negative for chest pain and palpitations.   Gastrointestinal: Negative for diarrhea, nausea and vomiting.   Endocrine: Negative for heat intolerance.   Genitourinary: Negative for dysuria and frequency.   Musculoskeletal: Positive for arthralgias and myalgias. Negative for back pain.   Skin: Negative for rash and wound.   Neurological: Positive for weakness. Negative for dizziness and light-headedness.   Psychiatric/Behavioral: Negative for confusion. The patient is not nervous/anxious.    All other systems reviewed and are negative.       All pertinent negatives and positives are as above. All other systems have been reviewed and are negative unless otherwise stated.     Objective    Temp:  [97.4 °F (36.3 °C)-98.2 °F (36.8 °C)] 98.2 °F (36.8 °C)  Heart Rate:  [87-97] 95  Resp:  [16-18] 18  BP: (100-149)/(72-83) 131/72  Physical Exam   Constitutional: She is oriented to person, place, and time. She appears well-developed and well-nourished. Nasal cannula in place.   HENT:   Head: Normocephalic and atraumatic.   Right Ear: External ear normal.   Left Ear: External ear normal.   Nose: Nose normal.   Mouth/Throat: Oropharynx is clear and moist.   Eyes: Conjunctivae and EOM are normal.   Neck: Normal range of motion. Neck supple.   Cardiovascular: Normal rate, regular rhythm and normal heart sounds.    Pulmonary/Chest: Effort normal. She has decreased breath sounds. She has rhonchi.   Abdominal: Soft. Bowel sounds are normal. She exhibits no distension. There is no tenderness.   Musculoskeletal: Normal range of motion.   Neurological: She is alert and oriented to person, place, and time.   Skin: Skin is warm and dry.   Psychiatric: She has a normal mood and affect. Her speech is normal and behavior is normal. Cognition and memory are normal.           Results Review:  I have reviewed the labs, radiology results, and diagnostic studies.    Laboratory Data:   Results from last 7 days   Lab Units 07/01/19  0603 06/30/19  0831   WBC 10*3/mm3 9.59 6.96   HEMOGLOBIN g/dL 9.4* 9.5*   HEMATOCRIT % 29.7* 30.5*   PLATELETS 10*3/mm3 122* 139        Results from last 7 days   Lab Units 07/01/19  0603 06/30/19  0831   SODIUM mmol/L 133* 134*   POTASSIUM mmol/L 3.5 3.7   CHLORIDE mmol/L 99 98   CO2 mmol/L 28.0 29.0   BUN mg/dL 4* 8   CREATININE mg/dL 0.24* 0.35*   CALCIUM mg/dL 7.5* 8.3*   BILIRUBIN mg/dL 0.5 0.7   ALK PHOS U/L 478* 599*   ALT (SGPT) U/L 29 32   AST (SGOT) U/L 55* 73*   GLUCOSE mg/dL 122* 82       Culture Data:   Blood Culture   Date Value Ref Range Status   06/30/2019 No growth at 24 hours  Preliminary   06/30/2019 No growth at 24 hours  Preliminary     Urine Culture   Date Value Ref Range Status   06/30/2019 No growth at 24 hours  Preliminary       Radiology Data:   Imaging Results (last 24 hours)     ** No results found for the last 24 hours. **          I have reviewed the patient's current medications.     Assessment/Plan     Active Hospital Problems    Diagnosis   • Failure to thrive in adult     1.  Wide-spread metastatic cancer  -Hospice consulted    2.  Pneumonia  -Abx to PO    3.  Tobacco abuse  -Cessation counseling    4.  Anxiety/depression  -Home meds    5.  Anemia of chronic Disease  -monitor H&H              Discharge Planning: I expect the patient to be discharged to home with hospice in  AM  Filippo Dhillon MD   07/01/19   3:36 PM

## 2019-07-01 NOTE — PROGRESS NOTES
Discharge Planning Assessment  [facility]     Patient Name: Jud Dhillon  MRN: [id number]  Today's Date: 7/1/2019    Admit Date: 6/30/2019    Discharge Needs Assessment     Row Name 07/01/19 1024       Living Environment    Lives With  spouse    Name(s) of Who Lives With Patient  Marc    Current Living Arrangements  home/apartment/condo    Primary Care Provided by  self    Provides Primary Care For  no one    Family Caregiver if Needed  spouse;child(neha), adult    Quality of Family Relationships  supportive;involved;helpful    Able to Return to Prior Arrangements  yes       Resource/Environmental Concerns    Resource/Environmental Concerns  none    Transportation Concerns  car, none       Transition Planning    Patient/Family Anticipates Transition to  home with family    Patient/Family Anticipated Services at Transition  hospice care    Transportation Anticipated  family or friend will provide       Discharge Needs Assessment    Readmission Within the Last 30 Days  no previous admission in last 30 days    Concerns to be Addressed  adjustment to diagnosis/illness;basic needs    Equipment Currently Used at Home  none    Anticipated Changes Related to Illness  inability to care for self    Equipment Needed After Discharge  oxygen    Outpatient/Agency/Support Group Needs  home hospice    Current Discharge Risk  terminally ill        Discharge Plan     Row Name 07/01/19 1025       Plan    Plan  [facility]    Patient/Family in Agreement with Plan  yes    Plan Comments  Spoke with pt to assess for home needs. Pt lives at home with spouse and plans same. Pt has no DME at present time and no HH Care. Pt does have a PCP and RX coverage. Pt says she will travel home via car at discharge.  Pt is agreeable to go home under hospice services.  Pt will be referred to [facility] today [phone]. Will follow.     Addendum: Spoke with Yesica from [facility] and provided referral.         Destination      No service  coordination in this encounter.      Durable Medical Equipment      No service coordination in this encounter.      Dialysis/Infusion      No service coordination in this encounter.      Home Medical Care      No service coordination in this encounter.      Therapy      No service coordination in this encounter.      Community Resources      No service coordination in this encounter.          Demographic Summary    No documentation.       Functional Status    No documentation.       Psychosocial    No documentation.       Abuse/Neglect    No documentation.       Legal    No documentation.       Substance Abuse    No documentation.       Patient Forms    No documentation.           ANTONETTE Kathleen

## 2019-07-01 NOTE — PROGRESS NOTES
Plan per chart review patient/family agreed to go home with hospice today. Orders placed by Dr. Drew this AM. Discussed with Dr. Dhillon. Will plan to cancel consult. Please re-consult if pateint requirs symptom management needs.    Thanks,   RONAN Byrne  Palliative Care

## 2019-07-01 NOTE — PROGRESS NOTES
"[facility] Progress Note    Progress Note    Patient:  Jud Dhillon  YOB: 1965  Date of Service: 7/1/2019  MRN: [id number]   Acct: [id number]   Primary Care Physician: Hernan Ybarra MD  Advance Directive:   Code Status and Medical Interventions:   Ordered at: 07/01/19 0632     Level Of Support Discussed With:    Patient     Code Status:    No CPR     Medical Interventions (Level of Support Prior to Arrest):    Comfort Measures     Admit Date: 6/30/2019       Hospital Day: 0      Subjective:     Chief Compliant: \"Weak.\"  Doing poorly.  Seen with nurse Gloria at bedside.        Review of Systems:   Review of Systems   Constitutional: Negative for chills, diaphoresis and fever.   HENT: Negative for mouth sores and nosebleeds.    Eyes: Negative for redness and visual disturbance.   Respiratory: Negative for shortness of breath and wheezing.    Cardiovascular: Negative for chest pain and leg swelling.   Gastrointestinal: Positive for abdominal pain. Negative for nausea and vomiting.   Genitourinary: Negative for flank pain and hematuria.   Neurological: Negative for tremors and speech difficulty.   Hematological: Does not bruise/bleed easily.   Psychiatric/Behavioral: Negative for agitation, behavioral problems, confusion and hallucinations.           Medications:   Scheduled Meds:  aspirin 162 mg Oral Daily   atorvastatin 40 mg Oral Nightly   enoxaparin 30 mg Subcutaneous Q24H   escitalopram 20 mg Oral Nightly   famotidine 20 mg Intravenous BID   guaiFENesin 600 mg Oral Q12H   ipratropium-albuterol 3 mL Nebulization 4x Daily - RT   levothyroxine 200 mcg Oral Q AM   levothyroxine 25 mcg Oral Q AM   methylPREDNISolone sodium succinate 40 mg Intravenous Q6H   piperacillin-tazobactam 4.5 g Intravenous Q8H   sodium chloride 3 mL Intravenous Q12H   vancomycin 500 mg Intravenous Q12H       Continuous Infusions:  lactated ringers 75 mL/hr Last Rate: 75 mL/hr (06/30/19 6382)       Labs:     Lab Results " (last 72 hours)     Procedure Component Value Units Date/Time    Lipase [[id number]]  (Abnormal) Collected:  07/01/19 0603    Specimen:  Blood Updated:  07/01/19 0639     Lipase 1,151 U/L     Copper, Serum [[id number]] Collected:  07/01/19 0603    Specimen:  Blood Updated:  07/01/19 0633    Immunoglobulins A/E/G/M Serum [[id number]] Collected:  07/01/19 0604    Specimen:  Blood Updated:  07/01/19 0633    Beta 2 Microglobulin, Serum [[id number]] Collected:  07/01/19 0604    Specimen:  Blood Updated:  07/01/19 0632    JONATHAN,PE and FLC, Serum [[id number]] Collected:  07/01/19 0604    Specimen:  Blood Updated:  07/01/19 0632    CBC Auto Differential [[id number]]  (Abnormal) Collected:  07/01/19 0603    Specimen:  Blood Updated:  07/01/19 0631     WBC 9.59 10*3/mm3      RBC 3.87 10*6/mm3      Hemoglobin 9.4 g/dL      Hematocrit 29.7 %      MCV 76.7 fL      MCH 24.3 pg      MCHC 31.6 g/dL      RDW 20.8 %      RDW-SD 56.8 fl      MPV -- fL      Comment: Unable to calculate        Platelets 122 10*3/mm3     Comprehensive Metabolic Panel [[id number]] Collected:  07/01/19 0603    Specimen:  Blood Updated:  07/01/19 0629    Lipid Panel [[id number]] Collected:  07/01/19 0603    Specimen:  Blood Updated:  07/01/19 0629    Manual Differential [[id number]] Collected:  07/01/19 0603    Specimen:  Blood Updated:  07/01/19 0628    TSH [[id number]] Collected:  07/01/19 0603    Specimen:  Blood Updated:  07/01/19 0618    Hemoglobin A1c [[id number]] Collected:  07/01/19 0603    Specimen:  Blood Updated:  07/01/19 0618    Vitamin B12 [[id number]]  (Normal) Collected:  06/30/19 0941    Specimen:  Blood Updated:  06/30/19 1913     Vitamin B-12 503 pg/mL     Folate [[id number]]  (Normal) Collected:  06/30/19 0941    Specimen:  Blood Updated:  06/30/19 1913     Folate 8.39 ng/mL     Ferritin [[id number]]  (Normal) Collected:  06/30/19 0941    Specimen:  Blood Updated:  06/30/19 1842     Ferritin 101.00 ng/mL     Iron Profile [[id number]]  (Abnormal)  Collected:  06/30/19 0941    Specimen:  Blood Updated:  06/30/19 1813     Iron 67 mcg/dL      TIBC 224 mcg/dL      Iron Saturation 30 %     Peripheral Blood Smear [[id number]] Collected:  06/30/19 0831    Specimen:  Blood Updated:  06/30/19 1759    Troponin [[id number]]  (Normal) Collected:  06/30/19 1530    Specimen:  Blood Updated:  06/30/19 1622     Troponin I 0.029 ng/mL     Troponin [[id number]]  (Abnormal) Collected:  06/30/19 1132    Specimen:  Blood Updated:  06/30/19 1229     Troponin I 0.037 ng/mL     [facility] [[id number]] Collected:  06/30/19 0941    Specimen:  Blood Updated:  06/30/19 1045    Narrative:       The following orders were created for panel order [facility].  Procedure                               Abnormality         Status                     ---------                               -----------         ------                     Red Top[[id number]]                                          Final result                 Please view results for these tests on the individual orders.    Red Top [[id number]] Collected:  06/30/19 0941    Specimen:  Blood Updated:  06/30/19 1045     Extra Tube Hold for add-ons.     Comment: Auto resulted.       Protime-INR [[id number]]  (Normal) Collected:  06/30/19 0941    Specimen:  Blood Updated:  06/30/19 0957     Protime 14.0 Seconds      INR 1.05    aPTT [[id number]]  (Abnormal) Collected:  06/30/19 0941    Specimen:  Blood Updated:  06/30/19 0957     PTT 35.8 seconds     Manual Differential [[id number]]  (Abnormal) Collected:  06/30/19 0831    Specimen:  Blood Updated:  06/30/19 0931     Neutrophil % 74.0 %      Lymphocyte % 13.0 %      Monocyte % 3.0 %      Eosinophil % 4.0 %      Bands %  4.0 %      Metamyelocyte % 2.0 %      Neutrophils Absolute 5.43 10*3/mm3      Lymphocytes Absolute 0.90 10*3/mm3      Monocytes Absolute 0.21 10*3/mm3      Eosinophils Absolute 0.28 10*3/mm3      nRBC 1.0 /100 WBC      Acanthocytes Slight/1+     Anisocytosis Slight/1+     [place]  Cells Slight/1+     Microcytes Slight/1+     Ovalocytes Slight/1+     Poikilocytes Large/3+     Polychromasia Slight/1+     Schistocytes Slight/1+     WBC Morphology Normal     Giant Platelets Slight/1+    CBC & Differential [[id number]] Collected:  06/30/19 0831    Specimen:  Blood Updated:  06/30/19 0931    Narrative:       The following orders were created for panel order CBC & Differential.  Procedure                               Abnormality         Status                     ---------                               -----------         ------                     CBC Auto Differential[[id number]]        Abnormal            Final result                 Please view results for these tests on the individual orders.    CBC Auto Differential [[id number]]  (Abnormal) Collected:  06/30/19 0831    Specimen:  Blood Updated:  06/30/19 0931     WBC 6.96 10*3/mm3      RBC 3.95 10*6/mm3      Hemoglobin 9.5 g/dL      Hematocrit 30.5 %      MCV 77.2 fL      MCH 24.1 pg      MCHC 31.1 g/dL      RDW 20.9 %      RDW-SD 57.4 fl      MPV 10.1 fL      Platelets 139 10*3/mm3     Narrative:       ckd    Blood Culture - Blood, Arm, Left [[id number]] Collected:  06/30/19 0829    Specimen:  Blood from Arm, Left Updated:  06/30/19 0928    Blood Culture - Blood, Arm, Right [[id number]] Collected:  06/30/19 0840    Specimen:  Blood from Arm, Right Updated:  06/30/19 0928    Urinalysis With Culture If Indicated - Urine, Catheter In/Out [[id number]]  (Abnormal) Collected:  06/30/19 0847    Specimen:  Urine, Catheter In/Out Updated:  06/30/19 0917     Color, UA Dark Yellow     Appearance, UA Clear     pH, UA 6.0     Specific Gravity, UA 1.023     Glucose, UA Negative     Ketones, UA 80 mg/dL (3+)     Bilirubin, UA Moderate (2+)     Blood, UA Negative     Protein, UA 30 mg/dL (1+)     Leuk Esterase, UA Negative     Nitrite, UA Negative     Urobilinogen, UA 2.0 E.U./dL    Urinalysis, Microscopic Only - Urine, Catheter In/Out [[id number]]  (Abnormal)  Collected:  06/30/19 0847    Specimen:  Urine, Catheter In/Out Updated:  06/30/19 0917     RBC, UA None Seen /HPF      WBC, UA 0-2 /HPF      Bacteria, UA None Seen /HPF      Squamous Epithelial Cells, UA 0-2 /HPF      Hyaline Casts, UA None Seen /LPF      Methodology Manual Light Microscopy    Blood Gas, Arterial [[id number]]  (Abnormal) Collected:  06/30/19 0905    Specimen:  Arterial Blood Updated:  06/30/19 0916     Site Left Brachial     Modesot's Test N/A     pH, Arterial 7.434 pH units      pCO2, Arterial 40.3 mm Hg      pO2, Arterial 79.9 mm Hg      Comment: 84 Value below reference range        HCO3, Arterial 27.0 mmol/L      Comment: 83 Value above reference range        Base Excess, Arterial 2.5 mmol/L      Comment: 83 Value above reference range        O2 Saturation, Arterial 94.4 %      Temperature 37.0 C      Barometric Pressure for Blood Gas 753 mmHg      Modality Room Air     Flow Rate 2.0 lpm      Ventilator Mode NA     Collected by [id number]     Comment: Meter: [id number]     :  [id number]       Procalcitonin [[id number]]  (Abnormal) Collected:  06/30/19 0831    Specimen:  Blood Updated:  06/30/19 0908     Procalcitonin 0.59 ng/mL     Narrative:       SIRS, sepsis, severe sepsis, and septic shock are categorized according to the criteria of the consensus conference of the American College of Chest Physicians/Society of Critical Care Medicine.    PCT < 0.5 ng/mL     Systemic infection (sepsis) is not likely.    PCT >0.5 and < 2.0 ng/mL Systemic infection (sepsis) is possible, but other conditions are known to elevate PCT as well.    PCT > 2.0 ng/mL     Systemic infection (sepsis) is likely, unless other causes are known.      PCT > 10.0 ng/mL    Important systemic inflammatory response, almost exclusively due to severe bacterial sepsis or septic shock.    PCT values of < 0.5 ng/mL do not exclude an infection, because localized infections (without systemic signs) may be associated with such low  concentrations, or a systemic infection in its initial stages (<6 hours).  Increased PCT can occur without infection.  PCT concentrations between 0.5 and 2.0 ng/mL should be interpreted taking into account the patients history.  It is recommended to retest PCT within 6-24 hours if any concentrations < 2.0 ng/mL are obtained.    Ammonia [[id number]]  (Normal) Collected:  06/30/19 0831    Specimen:  Blood Updated:  06/30/19 0904     Ammonia 13 umol/L     Troponin [[id number]]  (Abnormal) Collected:  06/30/19 0831    Specimen:  Blood Updated:  06/30/19 0902     Troponin I 0.046 ng/mL     BNP [[id number]]  (Abnormal) Collected:  06/30/19 0831    Specimen:  Blood Updated:  06/30/19 0902     proBNP 2,580.0 pg/mL     Urine Culture - Urine, Urine, Catheter In/Out [[id number]] Collected:  06/30/19 0847    Specimen:  Urine, Catheter In/Out Updated:  06/30/19 0853    Lactic Acid, Plasma [[id number]]  (Normal) Collected:  06/30/19 0831    Specimen:  Blood Updated:  06/30/19 0851     Lactate 1.2 mmol/L     Lipase [[id number]]  (Abnormal) Collected:  06/30/19 0831    Specimen:  Blood Updated:  06/30/19 0851     Lipase 762 U/L     Amylase [[id number]]  (Abnormal) Collected:  06/30/19 0831    Specimen:  Blood Updated:  06/30/19 0851     Amylase 147 U/L     Comprehensive Metabolic Panel [[id number]]  (Abnormal) Collected:  06/30/19 0831    Specimen:  Blood Updated:  06/30/19 0851     Glucose 82 mg/dL      BUN 8 mg/dL      Creatinine 0.35 mg/dL      Sodium 134 mmol/L      Potassium 3.7 mmol/L      Chloride 98 mmol/L      CO2 29.0 mmol/L      Calcium 8.3 mg/dL      Total Protein 6.1 g/dL      Albumin 3.10 g/dL      ALT (SGPT) 32 U/L      AST (SGOT) 73 U/L      Alkaline Phosphatase 599 U/L      Total Bilirubin 0.7 mg/dL      eGFR Non African Amer >150 mL/min/1.73      Globulin 3.0 gm/dL      A/G Ratio 1.0 g/dL      BUN/Creatinine Ratio 22.9     Anion Gap 7.0 mmol/L     Narrative:       GFR Normal >60  Chronic Kidney Disease <60  Kidney Failure  "<15            Radiology:     Imaging Results (last 72 hours)     Procedure Component Value Units Date/Time    XR Abdomen 2 View With Chest 1 View [[id number]] Collected:  06/30/19 0828     Updated:  06/30/19 0834    Narrative:       EXAMINATION: XR ABDOMEN 2 VW W CHEST 1 VW- 6/30/2019 8:28 AM CDT     HISTORY: Chest pain.     REPORT: A frontal view the chest was obtained as well as upright and  supine views of the abdomen. Comparison is made with the chest x-ray  05/07/2008, [facility] 05/01/2008.     There is dense consolidation of the right upper lobe compatible with  acute pneumonia. There are coarse interstitial markings diffusely. There  is patchy infiltrate in the left lateral midlung zone. No pneumothorax  or pleural effusion is identified. Heart size is normal. There is  ectasia of the thoracic aorta. These of the abdomen demonstrate multiple  surgical clips in the right abdomen, no free air is identified. The  bowel gas pattern is nonspecific. The osseous structures show no acute  findings. There are no suspicious intra-abdominal calcifications.       Impression:       1. Consolidating infiltrate in the right upper lobe and patchy  infiltrate in the left midlung zone compatible with acute pneumonia.  Follow-up is recommended until the infiltrates resolve to exclude  underlying neoplasm. Chronic interstitial disease.  2. Nonspecific bowel gas pattern with no convincing evidence of  obstruction. No free air. Extensive previous right-sided abdominal  surgery.  This report was finalized on 06/30/2019 08:31 by Dr. Bandar Lindsey MD.            Objective:   Vitals: /76 (BP Location: Left arm, Patient Position: Lying)   Pulse 94   Temp 97.6 °F (36.4 °C) (Oral)   Resp 18   Ht 160 cm (62.99\")   Wt 52.6 kg (115 lb 15.4 oz)   SpO2 98%   BMI 20.55 kg/m²   Physical Exam   Constitutional: She is oriented to person, place, and time.   Chronically ill looking.   HENT:   Head: Normocephalic and atraumatic.   Eyes: No " "scleral icterus.   Neck: No JVD present.   Cardiovascular: Normal rate and regular rhythm.   Pulmonary/Chest: No respiratory distress. She has no wheezes.   Abdominal: Soft. Bowel sounds are normal. There is tenderness. There is no rebound and no guarding.   Musculoskeletal: She exhibits no edema.   Neurological: She is alert and oriented to person, place, and time.   Skin: There is pallor.   Psychiatric: She has a normal mood and affect. Her behavior is normal. Judgment and thought content normal.   Nursing note and vitals reviewed.    24HR INTAKE/OUTPUT:      Intake/Output Summary (Last 24 hours) at 7/1/2019 0644  Last data filed at 7/1/2019 0608  Gross per 24 hour   Intake 2982.4 ml   Output 200 ml   Net 2782.4 ml        Problem list:       Failure to thrive in adult      Assessment/Plan:     1. Multiple liver metastases, etiology undefined.  History of thyroid cancer and carcinoid tumor, small bowel, gallbladder, large intestine, and appendix.    2. Poor performance status of 3.   3. Pneumonia.   4. Tobacco abuse  5. Anxiety  6. Depression.  7. Microcytic anemia.     PLAN:  1.   Re:  Poor overall prognosis and too ill for palliative chemotherapy.  2.   Re:  No liver biopsy due to her poor overall prognosis.  3.   Re:  Supportive care/hospice and code status.  She agreed for hospice and comfort care.  \"I am so weak.  I was DNR when I came here.  Thank you.\"    4.   No office visit.  Will sign off.    5.   Above discussed with nurse Castro.  She verbalized understanding.     "

## 2019-07-01 NOTE — DISCHARGE PLACEMENT REQUEST
"Kia Ridley [phone]  Jud Dhillon (54 y.o. Female)     Date of Birth Social Security Number Address Home Phone MRN    1965  [address] [place] [id number] [phone] [id number]    Anglican Marital Status          Other        Admission Date Admission Type Admitting Provider Attending Provider Department, Room/Bed    6/30/19 Emergency Filippo Dhillon MD Moore, Jonathan Scott, MD [address], 495/1    Discharge Date Discharge Disposition Discharge Destination                       Attending Provider:  Filippo Dhillon MD    Allergies:  Avelox [Moxifloxacin Hcl], Codeine, Lortab [Hydrocodone-acetaminophen], Robaxin [Methocarbamol]    Isolation:  None   Infection:  None   Code Status:  No CPR    Ht:  160 cm (62.99\")   Wt:  52.6 kg (115 lb 15.4 oz)    Admission Cmt:  None   Principal Problem:  None                Active Insurance as of 6/30/2019     Primary Coverage     Payor Plan Insurance Group Employer/Plan Group    ANTH MEDICAID [facility] MEDICAID [id number]     Payor Plan Address Payor Plan Phone Number Payor Plan Fax Number Effective Dates    [address] [address] [phone]  4/1/2018 - None Entered    [facility] [id number]       Subscriber Name Subscriber Birth Date Member ID       JUD DHILLON 1965 [id number]                 Emergency Contacts      (Rel.) Home Phone Work Phone Mobile Phone    ReemaMarc cervantes (Spouse) [phone] -- --    alda Dhillon (Son) -- -- [phone]    Alejo Kate (Daughter) -- -- [phone]               History & Physical      Juice Lamar MD at 6/30/2019 11:45 AM              [facility] Medicine Services  HISTORY AND PHYSICAL    Date of Admission: 6/30/2019  Primary Care Physician: Hernan Ybarra MD    Subjective     Chief Complaint: Failure to thrive/pneumonia/liver lesion/nausea/abdomen pain    History of Present Illness  Patient is a 54-year-old female presents " ER by the family wanted second opinion about her cancer.  Patient has a history of severe upper abdomen pain intermittently for the last 4 to 5 months.  Increase in abdomen pain with food.  Patient does not have desire to eat.  Patient lost about 30 pounds in the last 4 weeks.  About a week ago patient went to [facility] ER and was admitted with a diagnosis of pancreatitis.  It was discovered patient had multiple liver lesion and Dr. Drew was consulted.  Patient was discharged about 5 days ago with appointment to see Jeff in [place] to evaluate liver management on July 9-in about 2 weeks for now.  Patient stated to have intermediate weight loss and nausea vomiting.  Patient continues to lose weight.  Family stated patient is no longer ambulatory.  Patient came in for second opinion.    Patient also has continued chest pain and shortness of breath.  Has a history of MI 4 years ago.  Family states there is no cardiac work-up done.  Patient also has a history of COPD, patient does not require oxygen.  Patient denies any coughing or congestion.  Patient continued to have weakness since discharge and weight loss.  Primary complaint of lower extremity swelling since yesterday.    Family state patient's mother had a history of liver and pancreatic cancer.  Passed away in her 60s.  Father had small cell carcinoma patient has small bowel carcinoma with resection about 13 years ago.  Patient also have a history of thyroid cancer underwent radiation back in 2007.    Review of Systems   Constitutional: Positive for activity change, appetite change and fatigue. Negative for chills and fever.   HENT: Negative for hearing loss, nosebleeds, tinnitus and trouble swallowing.    Eyes: Negative for visual disturbance.   Respiratory: Positive for shortness of breath and wheezing. Negative for cough and chest tightness.    Cardiovascular: Positive for leg swelling. Negative for chest pain and palpitations.   Gastrointestinal:  Positive for nausea. Negative for abdominal distention, abdominal pain, blood in stool, constipation, diarrhea and vomiting.   Endocrine: Negative for cold intolerance, heat intolerance, polydipsia, polyphagia and polyuria.   Genitourinary: Negative for decreased urine volume, difficulty urinating, dysuria, flank pain, frequency and hematuria.   Musculoskeletal: Negative for arthralgias, joint swelling and myalgias.   Skin: Negative for rash.   Allergic/Immunologic: Negative for immunocompromised state.   Neurological: Positive for weakness. Negative for dizziness, syncope, light-headedness and headaches.   Hematological: Negative for adenopathy. Does not bruise/bleed easily.   Psychiatric/Behavioral: Negative for confusion and sleep disturbance. The patient is not nervous/anxious.         Otherwise complete ROS reviewed and negative except as mentioned in the HPI.      Past Medical History:   Past Medical History:   Diagnosis Date   • Asthma    • Carotid artery disease (CMS/HCC)    • COPD (chronic obstructive pulmonary disease) (CMS/HCC)    • Dizziness    • Hypertension    • Other kyphosis, thoracic region    • Pneumonia    • Small bowel carcinoma (CMS/HCC)     History of small bowel carcinoma and thyroid cancer   • Tinnitus    • Tobacco abuse        Past Surgical History:  Past Surgical History:   Procedure Laterality Date   • APPENDECTOMY     • CHOLECYSTECTOMY         Family History: family history includes Cancer in her father, maternal uncle, mother, paternal grandfather, and paternal uncle; Heart attack in her maternal grandmother.    Social History:  reports that she has been smoking.  She has a 22.50 pack-year smoking history. She has never used smokeless tobacco. She reports that she does not drink alcohol or use drugs.    Medications:  Prior to Admission medications    Medication Sig Start Date End Date Taking? Authorizing Provider   albuterol (PROVENTIL) (2.5 MG/3ML) 0.083% nebulizer solution  6/14/18    "Lona Carney MD   aspirin 81 MG tablet Take 81 mg by mouth.    Lona Carney MD   atorvastatin (LIPITOR) 40 MG tablet  6/14/18   Lona Carney MD   baclofen (LIORESAL) 10 MG tablet  6/14/18   Lona Carney MD   escitalopram (LEXAPRO) 20 MG tablet  6/14/18   Lona Carney MD   furosemide (LASIX) 20 MG tablet  6/14/18   Lona Carney MD   lisinopril (PRINIVIL,ZESTRIL) 2.5 MG tablet Take 2.5 mg by mouth Daily.    Lona Carney MD   meclizine (ANTIVERT) 25 MG tablet  6/14/18   Lona Carney MD   metoprolol succinate XL (TOPROL-XL) 25 MG 24 hr tablet  6/14/18   Lona Carney MD   Multiple Vitamins-Minerals (SENTRY PO)  6/14/18   Lona Carney MD   ondansetron ODT (ZOFRAN-ODT) 4 MG disintegrating tablet  5/14/18   Lona Carney MD   promethazine (PHENERGAN) 25 MG tablet  6/18/18   ProviderLona MD   QUEtiapine (SEROquel) 50 MG tablet  6/14/18   Lona Carney MD   raNITIdine (ZANTAC) 150 MG tablet  6/14/18   ProviderLona MD   VENTOLIN  (90 Base) MCG/ACT inhaler  6/14/18   Lona Carney MD     Allergies:  Allergies   Allergen Reactions   • Avelox [Moxifloxacin Hcl] Unknown (See Comments)     unknown   • Codeine Unknown (See Comments)     unknown   • Lortab [Hydrocodone-Acetaminophen] Unknown (See Comments)     Unknown   • Robaxin [Methocarbamol] Unknown (See Comments)     unknown       Objective     Vital Signs: /80   Pulse 90   Temp 97.9 °F (36.6 °C) (Oral)   Resp 13   Ht 160 cm (63\")   Wt 52.6 kg (116 lb)   SpO2 96%   BMI 20.55 kg/m²    Physical Exam   Constitutional: She is oriented to person, place, and time. She appears well-developed.   Cachectic.   HENT:   Head: Normocephalic.   Eyes: Conjunctivae are normal. Pupils are equal, round, and reactive to light.   Neck: Neck supple. No JVD present. No thyromegaly present.   Cardiovascular: Normal rate, regular rhythm, normal heart " sounds and intact distal pulses. Exam reveals no gallop and no friction rub.   No murmur heard.  Pulmonary/Chest: No respiratory distress. She has wheezes. She has no rales. She exhibits no tenderness.   Decreased breath sound bilateral,  wheezing.   Abdominal: Soft. Bowel sounds are normal. She exhibits no distension. There is no tenderness. There is no rebound and no guarding.   Musculoskeletal: Normal range of motion. She exhibits edema. She exhibits no tenderness or deformity.   2-3+ pitting edema left lower extremity.   Lymphadenopathy:     She has no cervical adenopathy.   Neurological: She is alert and oriented to person, place, and time. She displays normal reflexes. No cranial nerve deficit. She exhibits abnormal muscle tone. Coordination abnormal.   Skin: Skin is warm and dry. Capillary refill takes 2 to 3 seconds. No rash noted.   Psychiatric: She has a normal mood and affect. Her behavior is normal.   Nursing note and vitals reviewed.          Results Reviewed:    Lab Results (last 24 hours)     Procedure Component Value Units Date/Time    [place] Draw [[id number]] Collected:  06/30/19 0941    Specimen:  Blood Updated:  06/30/19 1045    Narrative:       The following orders were created for panel order [place] Draw.  Procedure                               Abnormality         Status                     ---------                               -----------         ------                     Red Top[[id number]]                                          Final result                 Please view results for these tests on the individual orders.    Red Top [[id number]] Collected:  06/30/19 0941    Specimen:  Blood Updated:  06/30/19 1045     Extra Tube Hold for add-ons.     Comment: Auto resulted.       Protime-INR [[id number]]  (Normal) Collected:  06/30/19 0941    Specimen:  Blood Updated:  06/30/19 0957     Protime 14.0 Seconds      INR 1.05    aPTT [[id number]]  (Abnormal) Collected:  06/30/19 0941    Specimen:   Blood Updated:  06/30/19 0957     PTT 35.8 seconds     Manual Differential [[id number]]  (Abnormal) Collected:  06/30/19 0831    Specimen:  Blood Updated:  06/30/19 0931     Neutrophil % 74.0 %      Lymphocyte % 13.0 %      Monocyte % 3.0 %      Eosinophil % 4.0 %      Bands %  4.0 %      Metamyelocyte % 2.0 %      Neutrophils Absolute 5.43 10*3/mm3      Lymphocytes Absolute 0.90 10*3/mm3      Monocytes Absolute 0.21 10*3/mm3      Eosinophils Absolute 0.28 10*3/mm3      nRBC 1.0 /100 WBC      Acanthocytes Slight/1+     Anisocytosis Slight/1+     Kaylan Cells Slight/1+     Microcytes Slight/1+     Ovalocytes Slight/1+     Poikilocytes Large/3+     Polychromasia Slight/1+     Schistocytes Slight/1+     WBC Morphology Normal     Giant Platelets Slight/1+    CBC & Differential [[id number]] Collected:  06/30/19 0831    Specimen:  Blood Updated:  06/30/19 0931    Narrative:       The following orders were created for panel order CBC & Differential.  Procedure                               Abnormality         Status                     ---------                               -----------         ------                     CBC Auto Differential[[id number]]        Abnormal            Final result                 Please view results for these tests on the individual orders.    CBC Auto Differential [[id number]]  (Abnormal) Collected:  06/30/19 0831    Specimen:  Blood Updated:  06/30/19 0931     WBC 6.96 10*3/mm3      RBC 3.95 10*6/mm3      Hemoglobin 9.5 g/dL      Hematocrit 30.5 %      MCV 77.2 fL      MCH 24.1 pg      MCHC 31.1 g/dL      RDW 20.9 %      RDW-SD 57.4 fl      MPV 10.1 fL      Platelets 139 10*3/mm3     Narrative:       ckd    Blood Culture - Blood, Arm, Left [[id number]] Collected:  06/30/19 0829    Specimen:  Blood from Arm, Left Updated:  06/30/19 0928    Blood Culture - Blood, Arm, Right [[id number]] Collected:  06/30/19 0840    Specimen:  Blood from Arm, Right Updated:  06/30/19 0928    Urinalysis With Culture If  Indicated - Urine, Catheter In/Out [[id number]]  (Abnormal) Collected:  06/30/19 0847    Specimen:  Urine, Catheter In/Out Updated:  06/30/19 0917     Color, UA Dark Yellow     Appearance, UA Clear     pH, UA 6.0     Specific Gravity, UA 1.023     Glucose, UA Negative     Ketones, UA 80 mg/dL (3+)     Bilirubin, UA Moderate (2+)     Blood, UA Negative     Protein, UA 30 mg/dL (1+)     Leuk Esterase, UA Negative     Nitrite, UA Negative     Urobilinogen, UA 2.0 E.U./dL    Urinalysis, Microscopic Only - Urine, Catheter In/Out [[id number]]  (Abnormal) Collected:  06/30/19 0847    Specimen:  Urine, Catheter In/Out Updated:  06/30/19 0917     RBC, UA None Seen /HPF      WBC, UA 0-2 /HPF      Bacteria, UA None Seen /HPF      Squamous Epithelial Cells, UA 0-2 /HPF      Hyaline Casts, UA None Seen /LPF      Methodology Manual Light Microscopy    Blood Gas, Arterial [[id number]]  (Abnormal) Collected:  06/30/19 0905    Specimen:  Arterial Blood Updated:  06/30/19 0916     Site Left Brachial     [place]'s Test N/A     pH, Arterial 7.434 pH units      pCO2, Arterial 40.3 mm Hg      pO2, Arterial 79.9 mm Hg      Comment: 84 Value below reference range        HCO3, Arterial 27.0 mmol/L      Comment: 83 Value above reference range        Base Excess, Arterial 2.5 mmol/L      Comment: 83 Value above reference range        O2 Saturation, Arterial 94.4 %      Temperature 37.0 C      Barometric Pressure for Blood Gas 753 mmHg      Modality Room Air     Flow Rate 2.0 lpm      Ventilator Mode NA     Collected by [id number]     Comment: Meter: [id number]     :  [id number]       Procalcitonin [[id number]]  (Abnormal) Collected:  06/30/19 0831    Specimen:  Blood Updated:  06/30/19 0908     Procalcitonin 0.59 ng/mL     Narrative:       SIRS, sepsis, severe sepsis, and septic shock are categorized according to the criteria of the consensus conference of the American College of Chest Physicians/Society of Critical Care Medicine.    PCT <  0.5 ng/mL     Systemic infection (sepsis) is not likely.    PCT >0.5 and < 2.0 ng/mL Systemic infection (sepsis) is possible, but other conditions are known to elevate PCT as well.    PCT > 2.0 ng/mL     Systemic infection (sepsis) is likely, unless other causes are known.      PCT > 10.0 ng/mL    Important systemic inflammatory response, almost exclusively due to severe bacterial sepsis or septic shock.    PCT values of < 0.5 ng/mL do not exclude an infection, because localized infections (without systemic signs) may be associated with such low concentrations, or a systemic infection in its initial stages (<6 hours).  Increased PCT can occur without infection.  PCT concentrations between 0.5 and 2.0 ng/mL should be interpreted taking into account the patients history.  It is recommended to retest PCT within 6-24 hours if any concentrations < 2.0 ng/mL are obtained.    Ammonia [[id number]]  (Normal) Collected:  06/30/19 0831    Specimen:  Blood Updated:  06/30/19 0904     Ammonia 13 umol/L     Troponin [[id number]]  (Abnormal) Collected:  06/30/19 0831    Specimen:  Blood Updated:  06/30/19 0902     Troponin I 0.046 ng/mL     BNP [[id number]]  (Abnormal) Collected:  06/30/19 0831    Specimen:  Blood Updated:  06/30/19 0902     proBNP 2,580.0 pg/mL     Urine Culture - Urine, Urine, Catheter In/Out [[id number]] Collected:  06/30/19 0847    Specimen:  Urine, Catheter In/Out Updated:  06/30/19 0853    Lactic Acid, Plasma [[id number]]  (Normal) Collected:  06/30/19 0831    Specimen:  Blood Updated:  06/30/19 0851     Lactate 1.2 mmol/L     Lipase [[id number]]  (Abnormal) Collected:  06/30/19 0831    Specimen:  Blood Updated:  06/30/19 0851     Lipase 762 U/L     Amylase [[id number]]  (Abnormal) Collected:  06/30/19 0831    Specimen:  Blood Updated:  06/30/19 0851     Amylase 147 U/L     Comprehensive Metabolic Panel [[id number]]  (Abnormal) Collected:  06/30/19 0831    Specimen:  Blood Updated:  06/30/19 0851     Glucose 82  mg/dL      BUN 8 mg/dL      Creatinine 0.35 mg/dL      Sodium 134 mmol/L      Potassium 3.7 mmol/L      Chloride 98 mmol/L      CO2 29.0 mmol/L      Calcium 8.3 mg/dL      Total Protein 6.1 g/dL      Albumin 3.10 g/dL      ALT (SGPT) 32 U/L      AST (SGOT) 73 U/L      Alkaline Phosphatase 599 U/L      Total Bilirubin 0.7 mg/dL      eGFR Non African Amer >150 mL/min/1.73      Globulin 3.0 gm/dL      A/G Ratio 1.0 g/dL      BUN/Creatinine Ratio 22.9     Anion Gap 7.0 mmol/L     Narrative:       GFR Normal >60  Chronic Kidney Disease <60  Kidney Failure <15           Radiology Data:    Imaging Results (last 24 hours)     Procedure Component Value Units Date/Time    XR Abdomen 2 View With Chest 1 View [[id number]] Collected:  06/30/19 0828     Updated:  06/30/19 0834    Narrative:       EXAMINATION: XR ABDOMEN 2 VW W CHEST 1 VW- 6/30/2019 8:28 AM CDT     HISTORY: Chest pain.     REPORT: A frontal view the chest was obtained as well as upright and  supine views of the abdomen. Comparison is made with the chest x-ray  05/07/2008, [facility] 05/01/2008.     There is dense consolidation of the right upper lobe compatible with  acute pneumonia. There are coarse interstitial markings diffusely. There  is patchy infiltrate in the left lateral midlung zone. No pneumothorax  or pleural effusion is identified. Heart size is normal. There is  ectasia of the thoracic aorta. These of the abdomen demonstrate multiple  surgical clips in the right abdomen, no free air is identified. The  bowel gas pattern is nonspecific. The osseous structures show no acute  findings. There are no suspicious intra-abdominal calcifications.       Impression:       1. Consolidating infiltrate in the right upper lobe and patchy  infiltrate in the left midlung zone compatible with acute pneumonia.  Follow-up is recommended until the infiltrates resolve to exclude  underlying neoplasm. Chronic interstitial disease.  2. Nonspecific bowel gas pattern with no  convincing evidence of  obstruction. No free air. Extensive previous right-sided abdominal  surgery.  This report was finalized on 06/30/2019 08:31 by Dr. Bandar Lindsey MD.          I have personally reviewed and interpreted the radiology studies and ECG obtained at time of admission.     Assessment / Plan      Assessment & Plan  Active Hospital Problems    Diagnosis   • Failure to thrive in adult     Plans    Pneumonia/COPD.  Chest ray shows right middle lobe consolidation.  Continue Zosyn and vancomycin for now.  Duo nebs 4 times daily.  Continue oxygen.  Admit to telemetry.  Continue Solu-Medrol.  Continue Mucinex.    Failure to thrive/30 pound weight loss over 4 weeks.    Liver lesions.  She had abdomen ultrasound and CT scan of abdomen and pelvic at [facility] about a week ago.  Try to get the results.   Patient has appoint to see [facility] in [place] on July 9, in 2 weeks.  Ultrasound of the abdomen on 6/20/19 from [facility]- liver is heterogeneous with multiple lesion suggestive of metastatic disease, no gallbladder, pancreas appeared to be large in size.  CT scan abdomen pelvic with IV contrast from [facility] 06/19/2019 shows new metastatic disease to the liver, distal small bowel dilatation without significant bowel obstruction.  Consult oncology.  Consult palliative care.    Chest pain/CAD/hypertension/hyperlipidemia.  Currently patient denies any chest pain.  Slightly elevated troponin.  Trend troponin.  Echocardiogram.  BNP 2.5K.  Continue aspirin.  Continue Lipitor.  Hold blood pressure medication due to hypotension.  Hold Lasix also due to hypotension.    Pancreatitis/abdomen pain.  Lipase 760.  Amylase 147.  Family stated previous lipase at [facility] was 18,000.  X-ray of abdomen pelvic-consolidation infiltrate right upper lobe with patchy infiltrate in the left mid lung zone compatible with acute pneumonia, chronic interstitial disease, nonspecific bowel gas pattern- no convincing  evidence of obstruction, no free air, extensive previous right side abdomen surgery.    Anemia.  Anemia panel.    Nausea/vomiting.  Pepcid IV.  Zofran.    Depression.  Continues Lexapro.     Thyroidism.  Continue Synthroid.  TSH.    Chronic pain.  Continue    Nutrition.  Speech consult to evaluate swallowing.  Nutrition consult.  Regular diet if able.    Deconditioning/failure to thrive.  PT and OT consult.    Blood cultures pending.  Urine cultures pending.    Code Status: full code     I discussed the patient's findings and my recommendations with: Pt and family    Estimated length of stay: 1-4 days.   Juice Lamar MD   06/30/19   11:46 AM              Electronically signed by Juice Lamar MD at 6/30/2019 12:43 PM       Hospital Medications (active)       Dose Frequency Start End    aspirin chewable tablet 162 mg 162 mg Daily 6/30/2019     Sig - Route: Chew 2 tablets Daily. - Oral    atorvastatin (LIPITOR) tablet 40 mg 40 mg Nightly 6/30/2019     Sig - Route: Take 1 tablet by mouth Every Night. - Oral    enoxaparin (LOVENOX) syringe 30 mg 30 mg Every 24 Hours 6/30/2019     Sig - Route: Inject 0.3 mL under the skin into the appropriate area as directed Daily. - Subcutaneous    escitalopram (LEXAPRO) tablet 20 mg 20 mg Nightly 6/30/2019     Sig - Route: Take 2 tablets by mouth Every Night. - Oral    famotidine (PEPCID) injection 20 mg 20 mg 2 Times Daily 6/30/2019     Sig - Route: Infuse 2 mL into a venous catheter 2 (Two) Times a Day. - Intravenous    fentaNYL citrate (PF) (SUBLIMAZE) injection 52.5 mcg 1 mcg/kg × 52.6 kg Once 6/30/2019 6/30/2019    Sig - Route: Infuse 1.05 mL into a venous catheter 1 (One) Time. - Intravenous    guaiFENesin (MUCINEX) 12 hr tablet 600 mg 600 mg Every 12 Hours Scheduled 6/30/2019     Sig - Route: Take 1 tablet by mouth Every 12 (Twelve) Hours. - Oral    HYDROmorphone (DILAUDID) injection solution 1 mg 1 mg Once 7/1/2019 7/1/2019    Sig - Route: Infuse 1 mL into a venous  catheter 1 (One) Time. - Intravenous    ipratropium-albuterol (DUO-NEB) nebulizer solution 3 mL 3 mL 4 Times Daily - RT 6/30/2019     Sig - Route: Take 3 mL by nebulization 4 (Four) Times a Day. - Nebulization    lactated ringers infusion 75 mL/hr Continuous 6/30/2019     Sig - Route: Infuse 75 mL/hr into a venous catheter Continuous. - Intravenous    levothyroxine (SYNTHROID, LEVOTHROID) tablet 200 mcg 200 mcg Every Early Morning 7/1/2019     Sig - Route: Take 1 tablet by mouth Every Morning. - Oral    levothyroxine (SYNTHROID, LEVOTHROID) tablet 25 mcg 25 mcg Every Early Morning 7/1/2019     Sig - Route: Take 1 tablet by mouth Every Morning. - Oral    methylPREDNISolone sodium succinate (SOLU-Medrol) injection 40 mg 40 mg Every 6 Hours 6/30/2019     Sig - Route: Infuse 1 mL into a venous catheter Every 6 (Six) Hours. - Intravenous    Morphine (MS CONTIN) 12 hr tablet 15 mg 15 mg 2 Times Daily PRN 6/30/2019     Sig - Route: Take 1 tablet by mouth 2 (Two) Times a Day As Needed for Severe Pain . - Oral    morphine injection 2 mg 2 mg Every 2 Hours PRN 7/1/2019 7/11/2019    Sig - Route: Infuse 1 mL into a venous catheter Every 2 (Two) Hours As Needed for Severe Pain . - Intravenous    ondansetron (ZOFRAN) injection 4 mg 4 mg Every 6 Hours PRN 6/30/2019     Sig - Route: Infuse 2 mL into a venous catheter Every 6 (Six) Hours As Needed for Nausea or Vomiting. - Intravenous    perflutren (DEFINITY) lipid microspheres injection 8.476 mg 1.3 mL Once 6/30/2019 6/30/2019    Sig - Route: Infuse 1.3 mL into a venous catheter 1 (One) Time. - Intravenous    piperacillin-tazobactam (ZOSYN) 4.5 g in iso-osmotic dextrose 100 mL IVPB (premix) 4.5 g Every 8 Hours 6/30/2019 7/7/2019    Sig - Route: Infuse 100 mL into a venous catheter Every 8 (Eight) Hours. - Intravenous    sodium chloride 0.9 % bolus 1,578 mL 30 mL/kg × 52.6 kg Once 6/30/2019 6/30/2019    Sig - Route: Infuse 1,578 mL into a venous catheter 1 (One) Time. - Intravenous     Cosign for Ordering: Accepted by Fern Cheek MD on 6/30/2019  9:44 AM    sodium chloride 0.9 % flush 3 mL 3 mL Every 12 Hours Scheduled 6/30/2019     Sig - Route: Infuse 3 mL into a venous catheter Every 12 (Twelve) Hours. - Intravenous    sodium chloride 0.9 % flush 3-10 mL 3-10 mL As Needed 6/30/2019     Sig - Route: Infuse 3-10 mL into a venous catheter As Needed for Line Care. - Intravenous    vancomycin 1 g/250 mL 0.9% NS (vial-mate) 20 mg/kg × 52.6 kg Once 6/30/2019 6/30/2019    Sig - Route: Infuse 250 mL into a venous catheter 1 (One) Time. - Intravenous    Cosign for Ordering: Accepted by Fern Cheek MD on 6/30/2019  9:44 AM    vancomycin 500 mg/100 mL 0.9% NS IVPB ([facility]) 500 mg Every 12 Hours 7/1/2019 7/8/2019    Sig - Route: Infuse 100 mL into a venous catheter Every 12 (Twelve) Hours. - Intravenous    piperacillin-tazobactam (ZOSYN) 3.375 g in iso-osmotic dextrose 50 ml (premix) (Discontinued) 3.375 g Every 8 Hours 6/30/2019 6/30/2019    Sig - Route: Infuse 50 mL into a venous catheter Every 8 (Eight) Hours. - Intravenous    Reason for Discontinue: Reorder    sodium chloride 0.9 % bolus 1,000 mL (Discontinued) 1,000 mL Once 6/30/2019 6/30/2019    Sig - Route: Infuse 1,000 mL into a venous catheter 1 (One) Time. - Intravenous    Cosign for Ordering: Accepted by Fern Cheek MD on 6/30/2019  9:44 AM    sodium chloride 0.9 % infusion (Discontinued) 125 mL/hr Continuous 6/30/2019 6/30/2019    Sig - Route: Infuse 125 mL/hr into a venous catheter Continuous. - Intravenous    Cosign for Ordering: Accepted by Fern Cheek MD on 6/30/2019  9:44 AM    vancomycin (VANCOCIN) in iso-osmotic dextrose IVPB 1 g (premix) 200 mL (Discontinued) 1,000 mg Every 12 Hours 6/30/2019 6/30/2019    Sig - Route: Infuse 200 mL into a venous catheter Every 12 (Twelve) Hours. - Intravenous    Reason for Discontinue: Reorder      Meds Comments as of 6/30/2019                          Physician Progress Notes  "(last 24 hours) (Notes from 6/30/2019 10:28 AM through 7/1/2019 10:28 AM)      Cherelle Boateng APRN at 7/1/2019  8:02 AM        Plan per chart review patient/family agreed to go home with hospice today. Orders placed by Dr. Drew this AM. Discussed with Dr. Dhillon. Will plan to cancel consult. Please re-consult if pateint requirs symptom management needs.    ThanksCherelle APRN  Palliative Care    Electronically signed by Cherelle Boateng APRN at 7/1/2019  8:05 AM     Ricardo Drew MD at 7/1/2019  6:44 AM          [facility] Progress Note    Progress Note    Patient:  Jud Dhillon  YOB: 1965  Date of Service: 7/1/2019  MRN: [id number]   Acct: [id number]   Primary Care Physician: Hernan Ybarra MD  Advance Directive:   Code Status and Medical Interventions:   Ordered at: 07/01/19 0632     Level Of Support Discussed With:    Patient     Code Status:    No CPR     Medical Interventions (Level of Support Prior to Arrest):    Comfort Measures     Admit Date: 6/30/2019       Hospital Day: 0      Subjective:     Chief Compliant: \"Weak.\"  Doing poorly.  Seen with nurse Castro at bedside.        Review of Systems:   Review of Systems   Constitutional: Negative for chills, diaphoresis and fever.   HENT: Negative for mouth sores and nosebleeds.    Eyes: Negative for redness and visual disturbance.   Respiratory: Negative for shortness of breath and wheezing.    Cardiovascular: Negative for chest pain and leg swelling.   Gastrointestinal: Positive for abdominal pain. Negative for nausea and vomiting.   Genitourinary: Negative for flank pain and hematuria.   Neurological: Negative for tremors and speech difficulty.   Hematological: Does not bruise/bleed easily.   Psychiatric/Behavioral: Negative for agitation, behavioral problems, confusion and hallucinations.           Medications:   Scheduled Meds:  aspirin 162 mg Oral Daily   atorvastatin 40 mg Oral Nightly   enoxaparin 30 mg " Subcutaneous Q24H   escitalopram 20 mg Oral Nightly   famotidine 20 mg Intravenous BID   guaiFENesin 600 mg Oral Q12H   ipratropium-albuterol 3 mL Nebulization 4x Daily - RT   levothyroxine 200 mcg Oral Q AM   levothyroxine 25 mcg Oral Q AM   methylPREDNISolone sodium succinate 40 mg Intravenous Q6H   piperacillin-tazobactam 4.5 g Intravenous Q8H   sodium chloride 3 mL Intravenous Q12H   vancomycin 500 mg Intravenous Q12H       Continuous Infusions:  lactated ringers 75 mL/hr Last Rate: 75 mL/hr (06/30/19 7347)       Labs:     Lab Results (last 72 hours)     Procedure Component Value Units Date/Time    Lipase [[id number]]  (Abnormal) Collected:  07/01/19 0603    Specimen:  Blood Updated:  07/01/19 0639     Lipase 1,151 U/L     Copper, Serum [[id number]] Collected:  07/01/19 0603    Specimen:  Blood Updated:  07/01/19 0633    Immunoglobulins A/E/G/M Serum [[id number]] Collected:  07/01/19 0604    Specimen:  Blood Updated:  07/01/19 0633    Beta 2 Microglobulin, Serum [[id number]] Collected:  07/01/19 0604    Specimen:  Blood Updated:  07/01/19 0632    JONATHAN,PE and FLC, Serum [[id number]] Collected:  07/01/19 0604    Specimen:  Blood Updated:  07/01/19 0632    CBC Auto Differential [[id number]]  (Abnormal) Collected:  07/01/19 0603    Specimen:  Blood Updated:  07/01/19 0631     WBC 9.59 10*3/mm3      RBC 3.87 10*6/mm3      Hemoglobin 9.4 g/dL      Hematocrit 29.7 %      MCV 76.7 fL      MCH 24.3 pg      MCHC 31.6 g/dL      RDW 20.8 %      RDW-SD 56.8 fl      MPV -- fL      Comment: Unable to calculate        Platelets 122 10*3/mm3     Comprehensive Metabolic Panel [[id number]] Collected:  07/01/19 0603    Specimen:  Blood Updated:  07/01/19 0629    Lipid Panel [[id number]] Collected:  07/01/19 0603    Specimen:  Blood Updated:  07/01/19 0629    Manual Differential [[id number]] Collected:  07/01/19 0603    Specimen:  Blood Updated:  07/01/19 0628    TSH [[id number]] Collected:  07/01/19 0603    Specimen:  Blood Updated:  07/01/19  0618    Hemoglobin A1c [[id number]] Collected:  07/01/19 0603    Specimen:  Blood Updated:  07/01/19 0618    Vitamin B12 [[id number]]  (Normal) Collected:  06/30/19 0941    Specimen:  Blood Updated:  06/30/19 1913     Vitamin B-12 503 pg/mL     Folate [[id number]]  (Normal) Collected:  06/30/19 0941    Specimen:  Blood Updated:  06/30/19 1913     Folate 8.39 ng/mL     Ferritin [[id number]]  (Normal) Collected:  06/30/19 0941    Specimen:  Blood Updated:  06/30/19 1842     Ferritin 101.00 ng/mL     Iron Profile [[id number]]  (Abnormal) Collected:  06/30/19 0941    Specimen:  Blood Updated:  06/30/19 1813     Iron 67 mcg/dL      TIBC 224 mcg/dL      Iron Saturation 30 %     Peripheral Blood Smear [[id number]] Collected:  06/30/19 0831    Specimen:  Blood Updated:  06/30/19 1759    Troponin [[id number]]  (Normal) Collected:  06/30/19 1530    Specimen:  Blood Updated:  06/30/19 1622     Troponin I 0.029 ng/mL     Troponin [[id number]]  (Abnormal) Collected:  06/30/19 1132    Specimen:  Blood Updated:  06/30/19 1229     Troponin I 0.037 ng/mL     [place] Draw [[id number]] Collected:  06/30/19 0941    Specimen:  Blood Updated:  06/30/19 1045    Narrative:       The following orders were created for panel order [place] Draw.  Procedure                               Abnormality         Status                     ---------                               -----------         ------                     Red Top[[id number]]                                          Final result                 Please view results for these tests on the individual orders.    Red Top [[id number]] Collected:  06/30/19 0941    Specimen:  Blood Updated:  06/30/19 1045     Extra Tube Hold for add-ons.     Comment: Auto resulted.       Protime-INR [[id number]]  (Normal) Collected:  06/30/19 0941    Specimen:  Blood Updated:  06/30/19 0957     Protime 14.0 Seconds      INR 1.05    aPTT [[id number]]  (Abnormal) Collected:  06/30/19 0941    Specimen:  Blood Updated:   06/30/19 0957     PTT 35.8 seconds     Manual Differential [[id number]]  (Abnormal) Collected:  06/30/19 0831    Specimen:  Blood Updated:  06/30/19 0931     Neutrophil % 74.0 %      Lymphocyte % 13.0 %      Monocyte % 3.0 %      Eosinophil % 4.0 %      Bands %  4.0 %      Metamyelocyte % 2.0 %      Neutrophils Absolute 5.43 10*3/mm3      Lymphocytes Absolute 0.90 10*3/mm3      Monocytes Absolute 0.21 10*3/mm3      Eosinophils Absolute 0.28 10*3/mm3      nRBC 1.0 /100 WBC      Acanthocytes Slight/1+     Anisocytosis Slight/1+     Havana Cells Slight/1+     Microcytes Slight/1+     Ovalocytes Slight/1+     Poikilocytes Large/3+     Polychromasia Slight/1+     Schistocytes Slight/1+     WBC Morphology Normal     Giant Platelets Slight/1+    CBC & Differential [[id number]] Collected:  06/30/19 0831    Specimen:  Blood Updated:  06/30/19 0931    Narrative:       The following orders were created for panel order CBC & Differential.  Procedure                               Abnormality         Status                     ---------                               -----------         ------                     CBC Auto Differential[[id number]]        Abnormal            Final result                 Please view results for these tests on the individual orders.    CBC Auto Differential [[id number]]  (Abnormal) Collected:  06/30/19 0831    Specimen:  Blood Updated:  06/30/19 0931     WBC 6.96 10*3/mm3      RBC 3.95 10*6/mm3      Hemoglobin 9.5 g/dL      Hematocrit 30.5 %      MCV 77.2 fL      MCH 24.1 pg      MCHC 31.1 g/dL      RDW 20.9 %      RDW-SD 57.4 fl      MPV 10.1 fL      Platelets 139 10*3/mm3     Narrative:       ckd    Blood Culture - Blood, Arm, Left [[id number]] Collected:  06/30/19 0829    Specimen:  Blood from Arm, Left Updated:  06/30/19 0928    Blood Culture - Blood, Arm, Right [[id number]] Collected:  06/30/19 0840    Specimen:  Blood from Arm, Right Updated:  06/30/19 0928    Urinalysis With Culture If Indicated -  Urine, Catheter In/Out [[id number]]  (Abnormal) Collected:  06/30/19 0847    Specimen:  Urine, Catheter In/Out Updated:  06/30/19 0917     Color, UA Dark Yellow     Appearance, UA Clear     pH, UA 6.0     Specific Gravity, UA 1.023     Glucose, UA Negative     Ketones, UA 80 mg/dL (3+)     Bilirubin, UA Moderate (2+)     Blood, UA Negative     Protein, UA 30 mg/dL (1+)     Leuk Esterase, UA Negative     Nitrite, UA Negative     Urobilinogen, UA 2.0 E.U./dL    Urinalysis, Microscopic Only - Urine, Catheter In/Out [[id number]]  (Abnormal) Collected:  06/30/19 0847    Specimen:  Urine, Catheter In/Out Updated:  06/30/19 0917     RBC, UA None Seen /HPF      WBC, UA 0-2 /HPF      Bacteria, UA None Seen /HPF      Squamous Epithelial Cells, UA 0-2 /HPF      Hyaline Casts, UA None Seen /LPF      Methodology Manual Light Microscopy    Blood Gas, Arterial [[id number]]  (Abnormal) Collected:  06/30/19 0905    Specimen:  Arterial Blood Updated:  06/30/19 0916     Site Left Brachial     [place]'s Test N/A     pH, Arterial 7.434 pH units      pCO2, Arterial 40.3 mm Hg      pO2, Arterial 79.9 mm Hg      Comment: 84 Value below reference range        HCO3, Arterial 27.0 mmol/L      Comment: 83 Value above reference range        Base Excess, Arterial 2.5 mmol/L      Comment: 83 Value above reference range        O2 Saturation, Arterial 94.4 %      Temperature 37.0 C      Barometric Pressure for Blood Gas 753 mmHg      Modality Room Air     Flow Rate 2.0 lpm      Ventilator Mode NA     Collected by [id number]     Comment: Meter: [id number]     :  [id number]       Procalcitonin [[id number]]  (Abnormal) Collected:  06/30/19 0831    Specimen:  Blood Updated:  06/30/19 0908     Procalcitonin 0.59 ng/mL     Narrative:       SIRS, sepsis, severe sepsis, and septic shock are categorized according to the criteria of the consensus conference of the American College of Chest Physicians/Society of Critical Care Medicine.    PCT < 0.5  ng/mL     Systemic infection (sepsis) is not likely.    PCT >0.5 and < 2.0 ng/mL Systemic infection (sepsis) is possible, but other conditions are known to elevate PCT as well.    PCT > 2.0 ng/mL     Systemic infection (sepsis) is likely, unless other causes are known.      PCT > 10.0 ng/mL    Important systemic inflammatory response, almost exclusively due to severe bacterial sepsis or septic shock.    PCT values of < 0.5 ng/mL do not exclude an infection, because localized infections (without systemic signs) may be associated with such low concentrations, or a systemic infection in its initial stages (<6 hours).  Increased PCT can occur without infection.  PCT concentrations between 0.5 and 2.0 ng/mL should be interpreted taking into account the patients history.  It is recommended to retest PCT within 6-24 hours if any concentrations < 2.0 ng/mL are obtained.    Ammonia [[id number]]  (Normal) Collected:  06/30/19 0831    Specimen:  Blood Updated:  06/30/19 0904     Ammonia 13 umol/L     Troponin [[id number]]  (Abnormal) Collected:  06/30/19 0831    Specimen:  Blood Updated:  06/30/19 0902     Troponin I 0.046 ng/mL     BNP [[id number]]  (Abnormal) Collected:  06/30/19 0831    Specimen:  Blood Updated:  06/30/19 0902     proBNP 2,580.0 pg/mL     Urine Culture - Urine, Urine, Catheter In/Out [[id number]] Collected:  06/30/19 0847    Specimen:  Urine, Catheter In/Out Updated:  06/30/19 0853    Lactic Acid, Plasma [[id number]]  (Normal) Collected:  06/30/19 0831    Specimen:  Blood Updated:  06/30/19 0851     Lactate 1.2 mmol/L     Lipase [[id number]]  (Abnormal) Collected:  06/30/19 0831    Specimen:  Blood Updated:  06/30/19 0851     Lipase 762 U/L     Amylase [[id number]]  (Abnormal) Collected:  06/30/19 0831    Specimen:  Blood Updated:  06/30/19 0851     Amylase 147 U/L     Comprehensive Metabolic Panel [[id number]]  (Abnormal) Collected:  06/30/19 0831    Specimen:  Blood Updated:  06/30/19 0851     Glucose 82 mg/dL       BUN 8 mg/dL      Creatinine 0.35 mg/dL      Sodium 134 mmol/L      Potassium 3.7 mmol/L      Chloride 98 mmol/L      CO2 29.0 mmol/L      Calcium 8.3 mg/dL      Total Protein 6.1 g/dL      Albumin 3.10 g/dL      ALT (SGPT) 32 U/L      AST (SGOT) 73 U/L      Alkaline Phosphatase 599 U/L      Total Bilirubin 0.7 mg/dL      eGFR Non African Amer >150 mL/min/1.73      Globulin 3.0 gm/dL      A/G Ratio 1.0 g/dL      BUN/Creatinine Ratio 22.9     Anion Gap 7.0 mmol/L     Narrative:       GFR Normal >60  Chronic Kidney Disease <60  Kidney Failure <15            Radiology:     Imaging Results (last 72 hours)     Procedure Component Value Units Date/Time    XR Abdomen 2 View With Chest 1 View [[id number]] Collected:  06/30/19 0828     Updated:  06/30/19 0834    Narrative:       EXAMINATION: XR ABDOMEN 2 VW W CHEST 1 VW- 6/30/2019 8:28 AM CDT     HISTORY: Chest pain.     REPORT: A frontal view the chest was obtained as well as upright and  supine views of the abdomen. Comparison is made with the chest x-ray  05/07/2008, [facility] 05/01/2008.     There is dense consolidation of the right upper lobe compatible with  acute pneumonia. There are coarse interstitial markings diffusely. There  is patchy infiltrate in the left lateral midlung zone. No pneumothorax  or pleural effusion is identified. Heart size is normal. There is  ectasia of the thoracic aorta. These of the abdomen demonstrate multiple  surgical clips in the right abdomen, no free air is identified. The  bowel gas pattern is nonspecific. The osseous structures show no acute  findings. There are no suspicious intra-abdominal calcifications.       Impression:       1. Consolidating infiltrate in the right upper lobe and patchy  infiltrate in the left midlung zone compatible with acute pneumonia.  Follow-up is recommended until the infiltrates resolve to exclude  underlying neoplasm. Chronic interstitial disease.  2. Nonspecific bowel gas pattern with no convincing  "evidence of  obstruction. No free air. Extensive previous right-sided abdominal  surgery.  This report was finalized on 06/30/2019 08:31 by Dr. Bandar Lindsey MD.            Objective:   Vitals: /76 (BP Location: Left arm, Patient Position: Lying)   Pulse 94   Temp 97.6 °F (36.4 °C) (Oral)   Resp 18   Ht 160 cm (62.99\")   Wt 52.6 kg (115 lb 15.4 oz)   SpO2 98%   BMI 20.55 kg/m²    Physical Exam   Constitutional: She is oriented to person, place, and time.   Chronically ill looking.   HENT:   Head: Normocephalic and atraumatic.   Eyes: No scleral icterus.   Neck: No JVD present.   Cardiovascular: Normal rate and regular rhythm.   Pulmonary/Chest: No respiratory distress. She has no wheezes.   Abdominal: Soft. Bowel sounds are normal. There is tenderness. There is no rebound and no guarding.   Musculoskeletal: She exhibits no edema.   Neurological: She is alert and oriented to person, place, and time.   Skin: There is pallor.   Psychiatric: She has a normal mood and affect. Her behavior is normal. Judgment and thought content normal.   Nursing note and vitals reviewed.    24HR INTAKE/OUTPUT:      Intake/Output Summary (Last 24 hours) at 7/1/2019 0644  Last data filed at 7/1/2019 0608  Gross per 24 hour   Intake 2982.4 ml   Output 200 ml   Net 2782.4 ml        Problem list:       Failure to thrive in adult      Assessment/Plan:     1. Multiple liver metastases, etiology undefined.  History of thyroid cancer and carcinoid tumor, small bowel, gallbladder, large intestine, and appendix.    2. Poor performance status of 3.   3. Pneumonia.   4. Tobacco abuse  5. Anxiety  6. Depression.  7. Microcytic anemia.     PLAN:  1.   Re:  Poor overall prognosis and too ill for palliative chemotherapy.  2.   Re:  No liver biopsy due to her poor overall prognosis.  3.   Re:  Supportive care/hospice and code status.  She agreed for hospice and comfort care.  \"I am so weak.  I was DNR when I came here.  " "Thank you.\"    4.   No office visit.  Will sign off.    5.   Above discussed with nurse Gloria.  She verbalized understanding.       Electronically signed by Ricardo Drew MD at 7/1/2019  6:53 AM          Consult Notes (last 24 hours) (Notes from 6/30/2019 10:28 AM through 7/1/2019 10:28 AM)      Sebastian Crespo MD at 6/30/2019  2:48 PM      Consult Orders    1. Inpatient Hematology & Oncology Consult [[id number]] ordered by Juice Lamar MD at 06/30/19 1242                [facility]    HEMATOLOGY & ONCOLOGY      CONSULTATION NOTE      REASON FOR CONSULT: Liver lesions  REFERRING PHYSICIAN: Juice Lamar MD    ADMISSION DIAGNOSIS  Failure to thrive in adult [R62.7]      Subjective     HISTORY OF PRESENT ILLNESS:     53 yo female with h/o thyroid cancer 2007 s/p radiation was seen by Dr Drew for multiple liver lesions and was supposed to f/u at [facility]. Presented to this admission due to failure to thive and oncology consult called. Pt reports not eating, feeling very weak and having more abdominal pain. Denies n/v, fever, cp, focal weakness. Rest of ros unremarkable.    Past Medical History:   Past Medical History:   Diagnosis Date   • Asthma    • Carotid artery disease (CMS/HCC)    • COPD (chronic obstructive pulmonary disease) (CMS/HCC)    • Dizziness    • Hypertension    • Other kyphosis, thoracic region    • Pneumonia    • Small bowel carcinoma (CMS/HCC)     History of small bowel carcinoma and thyroid cancer   • Tinnitus    • Tobacco abuse      Past Surgical History:   Past Surgical History:   Procedure Laterality Date   • APPENDECTOMY     • CHOLECYSTECTOMY       Social History:   Social History     Socioeconomic History   • Marital status:      Spouse name: Not on file   • Number of children: Not on file   • Years of education: Not on file   • Highest education level: Not on file   Tobacco Use   • Smoking status: Current Every Day Smoker     Packs/day: 0.50     Years: 45.00 "     Pack years: 22.50   • Smokeless tobacco: Never Used   Substance and Sexual Activity   • Alcohol use: No   • Drug use: No   • Sexual activity: Defer     Family History:   Family History   Problem Relation Age of Onset   • Cancer Mother         Breast Cancer, Liver Cancer   • Cancer Father         Lung Cancer, Brain Mets   • Cancer Maternal Uncle         Pancreatic Cancer   • Cancer Paternal Uncle         Lung Cancer   • Heart attack Maternal Grandmother    • Cancer Paternal Grandfather         Throat Cancer   • Stroke Neg Hx    • Aneurysm Neg Hx        Review of Systems   See above, otherwise unremarkable.  Medications:    Current Facility-Administered Medications   Medication Dose Route Frequency Provider Last Rate Last Dose   • aspirin chewable tablet 162 mg  162 mg Oral Daily Juice Lamar MD       • atorvastatin (LIPITOR) tablet 40 mg  40 mg Oral Nightly Juice Lamar MD       • enoxaparin (LOVENOX) syringe 30 mg  30 mg Subcutaneous Q24H Juice Lamar MD       • escitalopram (LEXAPRO) tablet 20 mg  20 mg Oral Nightly Juice Lamar MD       • famotidine (PEPCID) injection 20 mg  20 mg Intravenous BID Juice Lamar MD       • guaiFENesin (MUCINEX) 12 hr tablet 600 mg  600 mg Oral Q12H Juice Lamar MD       • ipratropium-albuterol (DUO-NEB) nebulizer solution 3 mL  3 mL Nebulization 4x Daily - RT Juice Lamar MD       • lactated ringers infusion  75 mL/hr Intravenous Continuous Juice Lamar MD       • [START ON 7/1/2019] levothyroxine (SYNTHROID, LEVOTHROID) tablet 200 mcg  200 mcg Oral Q AM Juice Lamar MD       • [START ON 7/1/2019] levothyroxine (SYNTHROID, LEVOTHROID) tablet 25 mcg  25 mcg Oral Q AM Juice Lamar MD       • methylPREDNISolone sodium succinate (SOLU-Medrol) injection 40 mg  40 mg Intravenous Q6H Juice Lamar MD       • Morphine (MS CONTIN) 12 hr tablet 15 mg  15 mg Oral BID PRN Juice Lamar MD       • ondansetron (ZOFRAN) injection 4 mg  4 mg Intravenous Q6H PRN  "Juice Lamar MD       • piperacillin-tazobactam (ZOSYN) 4.5 g in iso-osmotic dextrose 100 mL IVPB (premix)  4.5 g Intravenous Q8H Juice Lamar MD       • sodium chloride 0.9 % flush 3 mL  3 mL Intravenous Q12H Juice Lamar MD       • sodium chloride 0.9 % flush 3-10 mL  3-10 mL Intravenous PRN Juice Lamar MD       • [START ON 7/1/2019] vancomycin 500 mg/100 mL 0.9% NS IVPB ([facility])  500 mg Intravenous Q12H Juice Lamar MD           ALLERGIES:    Allergies   Allergen Reactions   • Avelox [Moxifloxacin Hcl] Palpitations and Other (See Comments)     Chest pain     • Codeine Rash   • Lortab [Hydrocodone-Acetaminophen] Rash   • Robaxin [Methocarbamol] Rash       Objective      Vitals:    06/30/19 1114 06/30/19 1205 06/30/19 1418 06/30/19 1424   BP:  121/71 118/76    BP Location:  Left arm Left arm    Patient Position:  Lying Lying    Pulse:  89 90    Resp: 13 16 15    Temp: 97.9 °F (36.6 °C) 97.6 °F (36.4 °C) 97.7 °F (36.5 °C)    TempSrc: Oral Oral Oral    SpO2:  96% 97%    Weight:    52.6 kg (115 lb 15.4 oz)   Height:    160 cm (62.99\")     /76 (BP Location: Left arm, Patient Position: Lying)   Pulse 90   Temp 97.7 °F (36.5 °C) (Oral)   Resp 15   Ht 160 cm (62.99\")   Wt 52.6 kg (115 lb 15.4 oz)   SpO2 97%   BMI 20.55 kg/m²      No flowsheet data found.      General Appearance:    Alert, cooperative, no distress, appears stated age   Head:    Normocephalic, without obvious abnormality, atraumatic   Eyes:    PERRL, conjunctiva/corneas clear, EOM's intact, fundi     benign, both eyes   Ears:    Normal TM's and external ear canals, both ears   Nose:   Nares normal, septum midline, mucosa normal, no drainage     or sinus tenderness   Throat:   Lips, mucosa, and tongue normal; teeth and gums normal   Neck:   Supple, symmetrical, trachea midline, no adenopathy;     thyroid:  no enlargement/tenderness/nodules; no carotid    bruit or JVD   Back:     Symmetric, no curvature, ROM normal, no CVA tenderness "   Lungs:     Clear to auscultation bilaterally, respirations unlabored   Chest Wall:    No tenderness or deformity    Heart:    Regular rate and rhythm, S1 and S2 normal, no murmur, rub    or gallop   Abdomen:     Soft, non-tender, bowel sounds active all four quadrants,     no masses, no organomegaly   Extremities:   Extremities normal, atraumatic, no cyanosis or edema   Pulses:   2+ and symmetric all extremities   Skin:   Skin color, texture, turgor normal, no rashes or lesions   Lymph nodes:   Cervical, supraclavicular, and axillary nodes normal   Neurologic:   CNII-XII intact, normal strength, sensation and reflexes     throughout       RECENT LABS:  Lab Results (last 72 hours)     Procedure Component Value Units Date/Time    Troponin [[id number]]  (Abnormal) Collected:  06/30/19 1132    Specimen:  Blood Updated:  06/30/19 1229     Troponin I 0.037 ng/mL     [place] [facility] [[id number]] Collected:  06/30/19 0941    Specimen:  Blood Updated:  06/30/19 1045    Narrative:       The following orders were created for panel order [place] [facility].  Procedure                               Abnormality         Status                     ---------                               -----------         ------                     Red Top[[id number]]                                          Final result                 Please view results for these tests on the individual orders.    Red Top [[id number]] Collected:  06/30/19 0941    Specimen:  Blood Updated:  06/30/19 1045     Extra Tube Hold for add-ons.     Comment: Auto resulted.       Protime-INR [[id number]]  (Normal) Collected:  06/30/19 0941    Specimen:  Blood Updated:  06/30/19 0957     Protime 14.0 Seconds      INR 1.05    aPTT [[id number]]  (Abnormal) Collected:  06/30/19 0941    Specimen:  Blood Updated:  06/30/19 0957     PTT 35.8 seconds     Manual Differential [[id number]]  (Abnormal) Collected:  06/30/19 0831    Specimen:  Blood Updated:  06/30/19 0931     Neutrophil % 74.0 %       Lymphocyte % 13.0 %      Monocyte % 3.0 %      Eosinophil % 4.0 %      Bands %  4.0 %      Metamyelocyte % 2.0 %      Neutrophils Absolute 5.43 10*3/mm3      Lymphocytes Absolute 0.90 10*3/mm3      Monocytes Absolute 0.21 10*3/mm3      Eosinophils Absolute 0.28 10*3/mm3      nRBC 1.0 /100 WBC      Acanthocytes Slight/1+     Anisocytosis Slight/1+     Kaylan Cells Slight/1+     Microcytes Slight/1+     Ovalocytes Slight/1+     Poikilocytes Large/3+     Polychromasia Slight/1+     Schistocytes Slight/1+     WBC Morphology Normal     Giant Platelets Slight/1+    CBC & Differential [[id number]] Collected:  06/30/19 0831    Specimen:  Blood Updated:  06/30/19 0931    Narrative:       The following orders were created for panel order CBC & Differential.  Procedure                               Abnormality         Status                     ---------                               -----------         ------                     CBC Auto Differential[[id number]]        Abnormal            Final result                 Please view results for these tests on the individual orders.    CBC Auto Differential [[id number]]  (Abnormal) Collected:  06/30/19 0831    Specimen:  Blood Updated:  06/30/19 0931     WBC 6.96 10*3/mm3      RBC 3.95 10*6/mm3      Hemoglobin 9.5 g/dL      Hematocrit 30.5 %      MCV 77.2 fL      MCH 24.1 pg      MCHC 31.1 g/dL      RDW 20.9 %      RDW-SD 57.4 fl      MPV 10.1 fL      Platelets 139 10*3/mm3     Narrative:       ckd    Blood Culture - Blood, Arm, Left [[id number]] Collected:  06/30/19 0829    Specimen:  Blood from Arm, Left Updated:  06/30/19 0928    Blood Culture - Blood, Arm, Right [[id number]] Collected:  06/30/19 0840    Specimen:  Blood from Arm, Right Updated:  06/30/19 0928    Urinalysis With Culture If Indicated - Urine, Catheter In/Out [[id number]]  (Abnormal) Collected:  06/30/19 0847    Specimen:  Urine, Catheter In/Out Updated:  06/30/19 0917     Color, UA Dark Yellow     Appearance, UA  Clear     pH, UA 6.0     Specific Gravity, UA 1.023     Glucose, UA Negative     Ketones, UA 80 mg/dL (3+)     Bilirubin, UA Moderate (2+)     Blood, UA Negative     Protein, UA 30 mg/dL (1+)     Leuk Esterase, UA Negative     Nitrite, UA Negative     Urobilinogen, UA 2.0 E.U./dL    Urinalysis, Microscopic Only - Urine, Catheter In/Out [[id number]]  (Abnormal) Collected:  06/30/19 0847    Specimen:  Urine, Catheter In/Out Updated:  06/30/19 0917     RBC, UA None Seen /HPF      WBC, UA 0-2 /HPF      Bacteria, UA None Seen /HPF      Squamous Epithelial Cells, UA 0-2 /HPF      Hyaline Casts, UA None Seen /LPF      Methodology Manual Light Microscopy    Blood Gas, Arterial [[id number]]  (Abnormal) Collected:  06/30/19 0905    Specimen:  Arterial Blood Updated:  06/30/19 0916     Site Left Brachial     [place]'s Test N/A     pH, Arterial 7.434 pH units      pCO2, Arterial 40.3 mm Hg      pO2, Arterial 79.9 mm Hg      Comment: 84 Value below reference range        HCO3, Arterial 27.0 mmol/L      Comment: 83 Value above reference range        Base Excess, Arterial 2.5 mmol/L      Comment: 83 Value above reference range        O2 Saturation, Arterial 94.4 %      Temperature 37.0 C      Barometric Pressure for Blood Gas 753 mmHg      Modality Room Air     Flow Rate 2.0 lpm      Ventilator Mode NA     Collected by [id number]     Comment: Meter: [id number]     :  [id number]       Procalcitonin [[id number]]  (Abnormal) Collected:  06/30/19 0831    Specimen:  Blood Updated:  06/30/19 0908     Procalcitonin 0.59 ng/mL     Narrative:       SIRS, sepsis, severe sepsis, and septic shock are categorized according to the criteria of the consensus conference of the American College of Chest Physicians/Society of Critical Care Medicine.    PCT < 0.5 ng/mL     Systemic infection (sepsis) is not likely.    PCT >0.5 and < 2.0 ng/mL Systemic infection (sepsis) is possible, but other conditions are known to elevate PCT as well.    PCT >  2.0 ng/mL     Systemic infection (sepsis) is likely, unless other causes are known.      PCT > 10.0 ng/mL    Important systemic inflammatory response, almost exclusively due to severe bacterial sepsis or septic shock.    PCT values of < 0.5 ng/mL do not exclude an infection, because localized infections (without systemic signs) may be associated with such low concentrations, or a systemic infection in its initial stages (<6 hours).  Increased PCT can occur without infection.  PCT concentrations between 0.5 and 2.0 ng/mL should be interpreted taking into account the patients history.  It is recommended to retest PCT within 6-24 hours if any concentrations < 2.0 ng/mL are obtained.    Ammonia [[id number]]  (Normal) Collected:  06/30/19 0831    Specimen:  Blood Updated:  06/30/19 0904     Ammonia 13 umol/L     Troponin [[id number]]  (Abnormal) Collected:  06/30/19 0831    Specimen:  Blood Updated:  06/30/19 0902     Troponin I 0.046 ng/mL     BNP [[id number]]  (Abnormal) Collected:  06/30/19 0831    Specimen:  Blood Updated:  06/30/19 0902     proBNP 2,580.0 pg/mL     Urine Culture - Urine, Urine, Catheter In/Out [[id number]] Collected:  06/30/19 0847    Specimen:  Urine, Catheter In/Out Updated:  06/30/19 0853    Lactic Acid, Plasma [[id number]]  (Normal) Collected:  06/30/19 0831    Specimen:  Blood Updated:  06/30/19 0851     Lactate 1.2 mmol/L     Lipase [[id number]]  (Abnormal) Collected:  06/30/19 0831    Specimen:  Blood Updated:  06/30/19 0851     Lipase 762 U/L     Amylase [[id number]]  (Abnormal) Collected:  06/30/19 0831    Specimen:  Blood Updated:  06/30/19 0851     Amylase 147 U/L     Comprehensive Metabolic Panel [[id number]]  (Abnormal) Collected:  06/30/19 0831    Specimen:  Blood Updated:  06/30/19 0851     Glucose 82 mg/dL      BUN 8 mg/dL      Creatinine 0.35 mg/dL      Sodium 134 mmol/L      Potassium 3.7 mmol/L      Chloride 98 mmol/L      CO2 29.0 mmol/L      Calcium 8.3 mg/dL      Total Protein 6.1 g/dL       Albumin 3.10 g/dL      ALT (SGPT) 32 U/L      AST (SGOT) 73 U/L      Alkaline Phosphatase 599 U/L      Total Bilirubin 0.7 mg/dL      eGFR Non African Amer >150 mL/min/1.73      Globulin 3.0 gm/dL      A/G Ratio 1.0 g/dL      BUN/Creatinine Ratio 22.9     Anion Gap 7.0 mmol/L     Narrative:       GFR Normal >60  Chronic Kidney Disease <60  Kidney Failure <15          None     RADIOLOGY:  Xr Abdomen 2 View With Chest 1 View    Result Date: 6/30/2019  Narrative: EXAMINATION: XR ABDOMEN 2 VW W CHEST 1 VW- 6/30/2019 8:28 AM CDT  HISTORY: Chest pain.  REPORT: A frontal view the chest was obtained as well as upright and supine views of the abdomen. Comparison is made with the chest x-ray 05/07/2008, KUB 05/01/2008.  There is dense consolidation of the right upper lobe compatible with acute pneumonia. There are coarse interstitial markings diffusely. There is patchy infiltrate in the left lateral midlung zone. No pneumothorax or pleural effusion is identified. Heart size is normal. There is ectasia of the thoracic aorta. These of the abdomen demonstrate multiple surgical clips in the right abdomen, no free air is identified. The bowel gas pattern is nonspecific. The osseous structures show no acute findings. There are no suspicious intra-abdominal calcifications.      Impression: 1. Consolidating infiltrate in the right upper lobe and patchy infiltrate in the left midlung zone compatible with acute pneumonia. Follow-up is recommended until the infiltrates resolve to exclude underlying neoplasm. Chronic interstitial disease. 2. Nonspecific bowel gas pattern with no convincing evidence of obstruction. No free air. Extensive previous right-sided abdominal surgery. This report was finalized on 06/30/2019 08:31 by Dr. Bandar Lindsey MD.            Assessment/Plan 55 yo female with h/o thyroid ca in 2007 s/p xrt, had abdominal imaging which showed multiple liver lesions concerning for metastasis. Pt is still in the process  of being workup for this. Appointment at [facility]   -currently no diagnosis. Dr Drew will be back on Monday and will take over care and management. No oncologic intervention indicated currently.  - anemia : will check iron profile, ferritin, b12, folate, myeloma panel.  -surya management of all acute medical problem      Failure to thrive in adult              Time Spent: 60 minutes; greater than  50% of time was spent in patient counseling and care coordination.     Sebastian Crespo MD    2019    2:48 PM     Thank you for this consultation and opportunity to participate in this patient's care.    Electronically signed by Sebastian Crespo MD at 2019  5:24 PM       [address] [address]  Phone:  [phone]  Fax:          Patient:     Jud Dhillon MRN:  [id number]   [address] [place] [id number] :  1965  SSN:    Phone: [phone] Sex:  F      [employer]ANCE PA[employer]AN [employer] # SUBSCRIBER ID   Primary:    ANTH MEDICAID [id number]   Admitting Diagnosis: Failure to thrive in adult [R62.7]  Order Date:  2019         Inpatient Hospice / [facility] Consult       (Order ID: [id number])     Diagnosis:         Priority:  Routine Expected Date:   Expiration Date:        Interval:   Count:    Reason for Consult? Stage 4 Cancer     Specimen Type:   Specimen Source:   Specimen Taken Date:   Specimen Taken Time:                   Authorizing Provider:Filippo Dhillon MD  Authorizing Provider's NPI: [id number]  Order Entered By: Filippo Dhillon MD 2019  8:19 AM     Electronically signed by: Filippo Dhillon MD 2019  8:19 AM

## 2019-07-01 NOTE — NURSING NOTE
Dr. Drew at bedside spoke with patient regarding end of life care and very poor prognosis r/t liver metastasis. Pt verbalizes that she wants hospice consult and choose to be DNR. Orders received from Dr. Drew. Pt crying, allowed patient to express concerns and ask questions. Patient wishes to call her brother who is a  for supportive care, declines hospital pastoral care at this time.

## 2019-07-01 NOTE — NURSING NOTE
Pt c/o of abdominal pain 10/10. Notified Dr. Martinez, made aware of prior situation and current condition. Orders received.

## 2019-07-01 NOTE — PLAN OF CARE
Problem: Patient Care Overview  Goal: Plan of Care Review  Outcome: Ongoing (interventions implemented as appropriate)   07/01/19 0829   Coping/Psychosocial   Plan of Care Reviewed With other (see comments)  (chart)   OTHER   Outcome Summary Per chart pt. plans to DC home with hospice this date. No OT eval completed. Please re consult if pt. plan changes.   Plan of Care Review   Progress no change

## 2019-07-01 NOTE — PLAN OF CARE
Problem: Patient Care Overview  Goal: Plan of Care Review  Outcome: Ongoing (interventions implemented as appropriate)   06/30/19 1900   Coping/Psychosocial   Plan of Care Reviewed With patient   OTHER   Outcome Summary recieved from ed in stable condition, see assessment.    Plan of Care Review   Progress no change       Problem: Fall Risk (Adult)  Goal: Identify Related Risk Factors and Signs and Symptoms  Outcome: Ongoing (interventions implemented as appropriate)    Goal: Absence of Fall  Outcome: Ongoing (interventions implemented as appropriate)      Problem: Pain, Acute (Adult)  Goal: Identify Related Risk Factors and Signs and Symptoms  Outcome: Ongoing (interventions implemented as appropriate)    Goal: Acceptable Pain Control/Comfort Level  Outcome: Ongoing (interventions implemented as appropriate)      Problem: Nutrition, Imbalanced: Inadequate Oral Intake (Adult)  Goal: Identify Related Risk Factors and Signs and Symptoms  Outcome: Ongoing (interventions implemented as appropriate)    Goal: Improved Oral Intake  Outcome: Ongoing (interventions implemented as appropriate)    Goal: Prevent Further Weight Loss  Outcome: Ongoing (interventions implemented as appropriate)      Problem: Skin Injury Risk (Adult)  Goal: Identify Related Risk Factors and Signs and Symptoms  Outcome: Ongoing (interventions implemented as appropriate)    Goal: Skin Health and Integrity  Outcome: Ongoing (interventions implemented as appropriate)

## 2019-07-01 NOTE — PLAN OF CARE
Problem: Patient Care Overview  Goal: Plan of Care Review  Outcome: Ongoing (interventions implemented as appropriate)   07/01/19 1047   Coping/Psychosocial   Plan of Care Reviewed With other (see comments)  (RN)   OTHER   Outcome Summary Received PT orders. Reviewed chart. Per chart, pt has decided to be discharged home w/ hospice. Plans for discharge this date. Will hold PT eval and sign off this date. Please reconsult if anything changes. Thank you for the referral.   Plan of Care Review   Progress (per RN and MD)

## 2019-07-01 NOTE — THERAPY DISCHARGE NOTE
Acute Care - Speech Language Pathology   Swallow Treatment Note/Discharge    [place]     Patient Name: Jud Dhillon  : 1965  MRN: [id number]  Today's Date: 2019               Admit Date: 2019    Swallowing tx completed this AM. No family present. Pt stated she continues to have limited PO intake, though she did state she has been attempting to drink her unthickened (as cleared per SLP) Boost. Pt agreed to attempt PO trials for possible upgrade. Pt was presented multiple thin liquid coffee trials via straw. Pt spontaneously consumed small sips, as well as nonconsecutive sips. No showed no overt s/s of aspiration. Pt was educated on risks for aspiration, pneumonia from aspiration, as well as further respiratory complications including respiratory failure. Knowing risks, pt desires to upgrade to thin liquids at this time. Continue with regular solid diet consistency. SLP to d/c ST services at this time secondary to change to comfort measures. MD to reconsult if pt becomes more appropriate for skilled ST services. Thanks! Misty Fung, CCC-SLP 2019 10:08 AM    Visit Dx:      ICD-10-CM ICD-9-CM   1. Failure to thrive in adult R62.7 783.7   2. Pneumonia of right middle lobe due to infectious organism (CMS/HCC) J18.1 486   3. Hypoxia R09.02 799.02   4. Liver lesion K76.9 573.8   5. Idiopathic acute pancreatitis, unspecified complication status K85.00 577.0   6. Elevated brain natriuretic peptide (BNP) level R79.89 790.99   7. Elevated troponin R74.8 790.6   8. Anemia, unspecified type D64.9 285.9   9. Elevated alkaline phosphatase level R74.8 790.5   10. Dysphagia, unspecified type R13.10 787.20     Patient Active Problem List   Diagnosis   • CAD (coronary artery disease)   • Tobacco abuse   • Hypertension   • Other kyphosis, thoracic region   • Dizziness   • Failure to thrive in adult       Therapy Treatment  Rehabilitation Treatment Summary     Row Name 19 0935             Treatment  Time/Intention    Discipline  speech language pathologist  -TM      Document Type  therapy note (daily note);discharge treatment  -TM      Subjective Information  no complaints  -TM      Mode of Treatment  individual therapy;speech-language pathology  -TM      Patient/Family Observations  No family present at time of tx.  -TM      Care Plan Review  care plan/treatment goals reviewed;risks/benefits reviewed;current/potential barriers reviewed  -TM      Patient Effort  good  -TM      Recorded by [TM] Misty Fung CCC-SLP 07/01/19 1002      Row Name 07/01/19 0935             Pain Assessment    Additional Documentation  Pain Scale: Numbers Pre/Post-Treatment (Group)  -TM      Recorded by [TM] Misty Fung CCC-SLP 07/01/19 1002      Row Name 07/01/19 0935             Pain Scale: Numbers Pre/Post-Treatment    Pain Scale: Numbers, Pretreatment  0/10 - no pain  -TM      Pain Scale: Numbers, Post-Treatment  0/10 - no pain  -TM      Recorded by [TM] Misty Fung CCC-SLP 07/01/19 1002      Row Name 07/01/19 0935             Outcome Summary/Treatment Plan (SLP)    Daily Summary of Progress (SLP)  progress toward functional goals is good  -TM      Barriers to Overall Progress (SLP)  Prior head/neck CA s/p radiation  -TM      Plan for Continued Treatment (SLP)  Upgrade to thin liquids, continue regular solids. D/C SLP services.   -TM      Anticipated Dischage Disposition  home;other (see comments) with hospice   -TM      Recorded by [TM] Misty Fung CCC-SLP 07/01/19 1002        User Key  (r) = Recorded By, (t) = Taken By, (c) = Cosigned By    Initials Name Effective Dates Discipline    TM Misty Fung CCC-SLP 08/02/16 -  SLP        Outcome Summary  Outcome Summary/Treatment Plan (SLP)  Daily Summary of Progress (SLP): progress toward functional goals is good (07/01/19 0935 : Misty Fung CCC-SLP)  Barriers to Overall Progress (SLP): Prior head/neck CA s/p radiation (07/01/19 0935 : Misty Fung  CCC-SLP)  Plan for Continued Treatment (SLP): Upgrade to thin liquids, continue regular solids. D/C SLP services.  (07/01/19 0935 : Mitsy Fung, CCC-SLP)  Anticipated Dischage Disposition: home, other (see comments)(with hospice ) (07/01/19 0935 : Misty Fung, [facility]-SLP)  SLP GOALS     Row Name 07/01/19 0935 06/30/19 1205          Oral Nutrition/Hydration Goal 1 (SLP)    Oral Nutrition/Hydration Goal 1, SLP  Pt will tolerate least restrictive diet with no overt s/s of aspiration.   -TM  Pt will tolerate least restrictive diet with no overt s/s of aspiration.   -MB     Time Frame (Oral Nutrition/Hydration Goal 1, SLP)  by discharge  -TM  by discharge  -MB     Barriers (Oral Nutrition/Hydration Goal 1, SLP)  Prior deficit  -TM  n/a  -MB     Progress/Outcomes (Oral Nutrition/Hydration Goal 1, SLP)  goal met  -TM  goal ongoing  -MB        Pharyngeal Strengthening Exercise Goal 1 (SLP)    Activity (Pharyngeal Strengthening Goal 1, SLP)  increase superior movement of the hyolaryngeal complex;increase squeeze/positive pressure generation  -TM  increase superior movement of the hyolaryngeal complex;increase squeeze/positive pressure generation  -MB     Increase Superior Movement of the Hyolaryngeal Complex  Mendelsohn  -TM  Mendelsohn  -MB     Increase Squeeze/Positive Pressure Generation  hard effortful swallow  -TM  hard effortful swallow  -MB     [place]/Accuracy (Pharyngeal Strengthening Goal 1, SLP)  independently (over 90% accuracy)  -TM  independently (over 90% accuracy)  -MB     Time Frame (Pharyngeal Strengthening Goal 1, SLP)  short term goal (STG);by discharge  -TM  short term goal (STG);by discharge  -MB     Barriers (Pharyngeal Strengthening Goal 1, SLP)  n/a  -TM  n/a  -MB     Progress/Outcomes (Pharyngeal Strengthening Goal 1, SLP)  goal no longer appropriate  -TM  goal ongoing  -MB       User Key  (r) = Recorded By, (t) = Taken By, (c) = Cosigned By    Initials Name Provider Type    MERARY Fatima  Brandon DONALD CCC-SLP Speech and Language Pathologist     Misty Fung CCC-SLP Speech and Language Pathologist          EDUCATION  The patient has been educated in the following areas:   Dysphagia (Swallowing Impairment).    SLP Recommendation and Plan  Daily Summary of Progress (SLP): progress toward functional goals is good  Barriers to Overall Progress (SLP): Prior head/neck CA s/p radiation  Plan for Continued Treatment (SLP): Upgrade to thin liquids, continue regular solids. D/C SLP services.   Anticipated Dischage Disposition: home, other (see comments)(with hospice )                    Time Calculation:   Time Calculation- SLP     Row Name 07/01/19 1007             Time Calculation- SLP    SLP Start Time  0935  -      SLP Stop Time  1000  -TM      SLP Time Calculation (min)  25 min  -      SLP Received On  07/01/19  -        User Key  (r) = Recorded By, (t) = Taken By, (c) = Cosigned By    Initials Name Provider Type     Misty Fung CCC-SLP Speech and Language Pathologist          Therapy Charges for Today     Code Description Service Date Service Provider Modifiers Qty    [id number]  ST TREATMENT SWALLOW 2 7/1/2019 Misty Fung CCC-SLP GN 1               SLP Discharge Summary  Anticipated Dischage Disposition: home, other (see comments)(with hospice )    SAPPHIRE James  7/1/2019

## 2019-07-01 NOTE — PROGRESS NOTES
Continued Stay Note  [facility]     Patient Name: Jud Dhillon  MRN: [id number]  Today's Date: 7/1/2019    Admit Date: 6/30/2019    Discharge Plan     Row Name 07/01/19 1348       Plan    Plan  [facility] set up for tomorrow 7/2    Patient/Family in Agreement with Plan  yes    Plan Comments  Spoke with Destiney from [facility] and they will set up DME for pt tomorrow and pt can be discharged after this arranged 7/2. Will follow.         Discharge Codes    No documentation.             ANTONETTE Kathleen

## 2019-07-01 NOTE — NURSING NOTE
"Spoke with patient at length regarding alleged hidden percocet. Pt states that she had hidden mucinex in cigarette pack, as she was afraid she was not going to get them. 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 She believes that Mucinex is saving her from a trach again. DUC Stark and I explained to her that if we had to perform rapid response we would use narcan and that would be very painful. Pt state \"I do not have any other pills, and will not take anything that nursing does not give me.\"   "

## 2019-07-02 VITALS
RESPIRATION RATE: 18 BRPM | HEART RATE: 88 BPM | WEIGHT: 115.96 LBS | DIASTOLIC BLOOD PRESSURE: 85 MMHG | OXYGEN SATURATION: 96 % | BODY MASS INDEX: 20.55 KG/M2 | SYSTOLIC BLOOD PRESSURE: 140 MMHG | HEIGHT: 63 IN | TEMPERATURE: 98.1 F

## 2019-07-02 LAB
ALBUMIN SERPL-MCNC: 2.3 G/DL (ref 2.9–4.4)
ALBUMIN/GLOB SERPL: 0.9 {RATIO} (ref 0.7–1.7)
ALPHA1 GLOB FLD ELPH-MCNC: 0.4 G/DL (ref 0–0.4)
ALPHA2 GLOB SERPL ELPH-MCNC: 1 G/DL (ref 0.4–1)
B-GLOBULIN SERPL ELPH-MCNC: 0.8 G/DL (ref 0.7–1.3)
B2 MICROGLOB SERPL-MCNC: 1.3 MG/L (ref 0.6–2.4)
BACTERIA SPEC AEROBE CULT: NORMAL
GAMMA GLOB SERPL ELPH-MCNC: 0.6 G/DL (ref 0.4–1.8)
GLOBULIN SER CALC-MCNC: 2.8 G/DL (ref 2.2–3.9)
IGA SERPL-MCNC: 266 MG/DL (ref 87–352)
IGG SERPL-MCNC: 477 MG/DL (ref 700–1600)
IGM SERPL-MCNC: 248 MG/DL (ref 26–217)
INTERPRETATION SERPL IEP-IMP: ABNORMAL
KAPPA LC SERPL-MCNC: 11 MG/L (ref 3.3–19.4)
KAPPA LC/LAMBDA SER: 0.67 {RATIO} (ref 0.26–1.65)
LAMBDA LC FREE SERPL-MCNC: 16.4 MG/L (ref 5.7–26.3)
Lab: ABNORMAL
M-SPIKE: ABNORMAL G/DL
PROT SERPL-MCNC: 5.1 G/DL (ref 6–8.5)

## 2019-07-02 PROCEDURE — G0378 HOSPITAL OBSERVATION PER HR: HCPCS

## 2019-07-02 PROCEDURE — 94799 UNLISTED PULMONARY SVC/PX: CPT

## 2019-07-02 PROCEDURE — 25010000002 METHYLPREDNISOLONE PER 40 MG: Performed by: FAMILY MEDICINE

## 2019-07-02 PROCEDURE — 25010000002 ONDANSETRON PER 1 MG: Performed by: FAMILY MEDICINE

## 2019-07-02 PROCEDURE — 94760 N-INVAS EAR/PLS OXIMETRY 1: CPT

## 2019-07-02 PROCEDURE — 25010000002 MORPHINE PER 10 MG: Performed by: FAMILY MEDICINE

## 2019-07-02 RX ORDER — MORPHINE SULFATE 100 MG/5ML
10 SOLUTION ORAL
Qty: 30 ML | Refills: 0 | Status: SHIPPED | OUTPATIENT
Start: 2019-07-02

## 2019-07-02 RX ORDER — AMOXICILLIN AND CLAVULANATE POTASSIUM 500; 125 MG/1; MG/1
1 TABLET, FILM COATED ORAL EVERY 12 HOURS SCHEDULED
Qty: 10 TABLET | Refills: 0 | Status: SHIPPED | OUTPATIENT
Start: 2019-07-02 | End: 2019-07-07

## 2019-07-02 RX ADMIN — FAMOTIDINE 20 MG: 10 INJECTION INTRAVENOUS at 08:03

## 2019-07-02 RX ADMIN — IPRATROPIUM BROMIDE AND ALBUTEROL SULFATE 3 ML: 2.5; .5 SOLUTION RESPIRATORY (INHALATION) at 11:04

## 2019-07-02 RX ADMIN — MORPHINE SULFATE 2 MG: 2 INJECTION, SOLUTION INTRAMUSCULAR; INTRAVENOUS at 01:38

## 2019-07-02 RX ADMIN — SODIUM CHLORIDE, POTASSIUM CHLORIDE, SODIUM LACTATE AND CALCIUM CHLORIDE 75 ML/HR: 600; 310; 30; 20 INJECTION, SOLUTION INTRAVENOUS at 08:03

## 2019-07-02 RX ADMIN — IPRATROPIUM BROMIDE AND ALBUTEROL SULFATE 3 ML: 2.5; .5 SOLUTION RESPIRATORY (INHALATION) at 08:08

## 2019-07-02 RX ADMIN — ONDANSETRON HYDROCHLORIDE 4 MG: 2 SOLUTION INTRAMUSCULAR; INTRAVENOUS at 08:03

## 2019-07-02 RX ADMIN — ONDANSETRON HYDROCHLORIDE 4 MG: 2 SOLUTION INTRAMUSCULAR; INTRAVENOUS at 01:43

## 2019-07-02 RX ADMIN — LEVOTHYROXINE SODIUM 200 MCG: 200 TABLET ORAL at 05:38

## 2019-07-02 RX ADMIN — MORPHINE SULFATE 2 MG: 2 INJECTION, SOLUTION INTRAMUSCULAR; INTRAVENOUS at 08:04

## 2019-07-02 RX ADMIN — LEVOTHYROXINE SODIUM 25 MCG: 25 TABLET ORAL at 05:37

## 2019-07-02 RX ADMIN — MORPHINE SULFATE 15 MG: 15 TABLET, FILM COATED, EXTENDED RELEASE ORAL at 06:47

## 2019-07-02 RX ADMIN — METHYLPREDNISOLONE SODIUM SUCCINATE 40 MG: 40 INJECTION, POWDER, FOR SOLUTION INTRAMUSCULAR; INTRAVENOUS at 08:03

## 2019-07-02 RX ADMIN — MORPHINE SULFATE 2 MG: 2 INJECTION, SOLUTION INTRAMUSCULAR; INTRAVENOUS at 10:13

## 2019-07-02 RX ADMIN — AMOXICILLIN AND CLAVULANATE POTASSIUM 500 MG: 500; 125 TABLET, FILM COATED ORAL at 08:03

## 2019-07-02 RX ADMIN — METHYLPREDNISOLONE SODIUM SUCCINATE 40 MG: 40 INJECTION, POWDER, FOR SOLUTION INTRAMUSCULAR; INTRAVENOUS at 02:16

## 2019-07-02 RX ADMIN — GUAIFENESIN 600 MG: 600 TABLET, EXTENDED RELEASE ORAL at 08:03

## 2019-07-02 RX ADMIN — ASPIRIN 81 MG 162 MG: 81 TABLET ORAL at 08:03

## 2019-07-02 RX ADMIN — MORPHINE SULFATE 2 MG: 2 INJECTION, SOLUTION INTRAMUSCULAR; INTRAVENOUS at 05:45

## 2019-07-02 NOTE — PROGRESS NOTES
Continued Stay Note   [place]     Patient Name: Jud Dhillon  MRN: [id number]  Today's Date: 7/2/2019    Admit Date: 6/30/2019    Discharge Plan     Row Name 07/02/19 1021       Plan    Plan  [facility]    Patient/Family in Agreement with Plan  yes    Final Discharge Disposition Code  50 - home with hospice    Final Note  Pt is discharged home today. [facility] arranging all needed DME including portable O2 to be delivered to room before she leaves today.  Pt going home via private car. [facility] [phone].        Discharge Codes    No documentation.       Expected Discharge Date and Time     Expected Discharge Date Expected Discharge Time    Jul 2, 2019             ANTONETTE Kathleen

## 2019-07-02 NOTE — PLAN OF CARE
Problem: Patient Care Overview  Goal: Plan of Care Review   07/02/19 0405   Coping/Psychosocial   Plan of Care Reviewed With patient   OTHER   Outcome Summary Medicated approximately every 2 hours with relief noted. Medicated x 1 for c/o nausea with good relief. Safety maintained. No new skin issues noted. Possible dc today home with hospice.   Plan of Care Review   Progress no change

## 2019-07-02 NOTE — PAYOR COMM NOTE
"[facility]  SENDY  [phone]   OR   FAX   [phone]    REF:[id number]      Jud Valdes (54 y.o. Female)     Date of Birth Social Security Number Address Home Phone MRN    1965  [address] [place] [id number] [phone] [id number]    Gnosticist Marital Status          Other        Admission Date Admission Type Admitting Provider Attending Provider Department, Room/Bed    6/30/19 Emergency Filippo Valdes MD  [facility] 4C, 495/1    Discharge Date Discharge Disposition Discharge Destination        7/2/2019 Hospice/Medical Facility (DC - External)              Attending Provider:  (none)   Allergies:  Avelox [Moxifloxacin Hcl], Codeine, Lortab [Hydrocodone-acetaminophen], Robaxin [Methocarbamol]    Isolation:  None   Infection:  None   Code Status:  Prior    Ht:  160 cm (62.99\")   Wt:  52.6 kg (115 lb 15.4 oz)    Admission Cmt:  None   Principal Problem:  None                Active Insurance as of 6/30/2019     Primary Coverage     Payor Plan Insurance Group Employer/Plan Group    [employer] MEDICAID [employer] MED[employer]ID [id number]     Payor Plan Address Payor Plan Phone Number Payor Plan Fax Number Effective Dates    [address] [address] [phone]  4/1/2018 - None Entered    [facility] [id number]       Subscriber Name Subscriber Birth Date Member ID       JUD VALDES 1965 [id number]                 Emergency Contacts      (Rel.) Home Phone Work Phone Mobile Phone    Marc Benavidez (Spouse) [phone] -- --    alda Valdes (Son) -- -- [phone]    Alejo Kate (Daughter) -- -- [phone]               Discharge Summary      Filippo Valdes MD at 7/2/2019  9:29 AM              [facility]  DISCHARGE SUMMARY       Date of Admission: 6/30/2019  Date of Discharge:  7/2/2019  Primary Care Physician: Hernan Ybarra MD    Presenting Problem/History of Present Illness:  Failure to " "thrive in adult [R62.7]     Final Discharge Diagnoses:  Active Hospital Problems    Diagnosis   • Failure to thrive in adult      1.  Wide-spread metastatic cancer  -Hospice consulted     2.  Pneumonia  -Abx to PO     3.  Tobacco abuse  -Cessation counseling     4.  Anxiety/depression  -Home meds     5.  Anemia of chronic Disease  -monitor H&H      Consults: Heme-onc, Hospice    Procedures Performed: NA    Pertinent Test Results: NA    Chief Complaint on Day of Discharge: \"I'm ready to go home.\"    History of Present Illness on Day of Discharge:   Doing ok.  Feels a bit better.  Wants to go home    Hospital Course:  The patient is a 54 y.o. female who presented to [facility] with abdominal pain and shortness of breath.  Recent abdominal imaging demonstrated multiple metastatic liver lesions.  She was found to also have pneumonia.  She was treated with IV antibiotics.      Heme-onc was consulted and discussed hospice with Mrs. Dhillon.  She has decided to go home with hospice.     Condition on Discharge:  Stable    Physical Exam on Discharge:  /85 (BP Location: Left arm, Patient Position: Lying)   Pulse 96   Temp 98.1 °F (36.7 °C) (Oral)   Resp 20   Ht 160 cm (62.99\")   Wt 52.6 kg (115 lb 15.4 oz)   SpO2 96%   BMI 20.55 kg/m²    Physical Exam   Constitutional: She is oriented to person, place, and time. She appears well-developed and well-nourished.   HENT:   Head: Normocephalic and atraumatic.   Right Ear: External ear normal.   Left Ear: External ear normal.   Nose: Nose normal.   Mouth/Throat: Oropharynx is clear and moist.   Eyes: Conjunctivae and EOM are normal.   Neck: Normal range of motion. Neck supple.   Cardiovascular: Normal rate, regular rhythm and normal heart sounds.   Pulmonary/Chest: Effort normal. She has decreased breath sounds. She has rhonchi.   Abdominal: Soft. Bowel sounds are normal. She exhibits no distension. There is no tenderness.   Musculoskeletal: Normal range of " motion.   Neurological: She is alert and oriented to person, place, and time.   Skin: Skin is warm and dry.   Psychiatric: She has a normal mood and affect. Her speech is normal and behavior is normal. Cognition and memory are normal.         Discharge Disposition:  Home with Hospice      Discharge Medications:     Discharge Medications      ASK your doctor about these medications      Instructions Start Date   albuterol (2.5 MG/3ML) 0.083% nebulizer solution  Commonly known as:  PROVENTIL   2.5 mg, Nebulization, Every 4 Hours PRN      VENTOLIN  (90 Base) MCG/ACT inhaler  Generic drug:  albuterol sulfate HFA   2 puffs, Inhalation, Every 4 Hours PRN      aspirin 325 MG tablet   325 mg, Oral, Daily      atorvastatin 40 MG tablet  Commonly known as:  LIPITOR   40 mg, Oral, Daily      baclofen 10 MG tablet  Commonly known as:  LIORESAL   10 mg, Oral, 3 Times Daily      escitalopram 20 MG tablet  Commonly known as:  LEXAPRO   20 mg, Oral, Nightly      furosemide 40 MG tablet  Commonly known as:  LASIX   40 mg, Oral, Every 8 Hours PRN      guaiFENesin 400 MG tablet  Commonly known as:  HUMIBID 3   400 mg, Oral, 3 Times Daily      ibuprofen 800 MG tablet  Commonly known as:  ADVIL,MOTRIN   800 mg, Oral, Every 8 Hours PRN      levothyroxine 200 MCG tablet  Commonly known as:  SYNTHROID, LEVOTHROID   200 mcg, Oral, Daily, Take with Synthroid 25 mcg (total dose of 225 mcg daily).       levothyroxine 25 MCG tablet  Commonly known as:  SYNTHROID, LEVOTHROID   25 mcg, Oral, Daily, Take with Synthroid 200 mcg (total dose of 225 mcg daily).       loratadine 10 MG tablet  Commonly known as:  CLARITIN   10 mg, Oral, Daily      meclizine 25 MG tablet  Commonly known as:  ANTIVERT   25 mg, Oral, 3 Times Daily      metoprolol succinate XL 25 MG 24 hr tablet  Commonly known as:  TOPROL-XL   25 mg, Oral, Daily      Morphine 30 MG 12 hr tablet  Commonly known as:  MS CONTIN   30 mg, Oral, 2 Times Daily      ondansetron ODT 4 MG  disintegrating tablet  Commonly known as:  ZOFRAN-ODT   4 mg, Oral, Every 8 Hours PRN      promethazine 25 MG tablet  Commonly known as:  PHENERGAN   25 mg, Oral, Every 8 Hours PRN      raNITIdine 150 MG tablet  Commonly known as:  ZANTAC   150 mg, Oral, 2 Times Daily             Discharge Diet: Regular    Activity at Discharge: as tolerated    Discharge Care Plan/Instructions: Follow up with Hospice    Follow-up Appointments:   No future appointments.    Test Results Pending at Discharge: JIAN Dhillon MD  07/02/19  9:29 AM    Time: 35 minutes          Electronically signed by Filippo Dhillon MD at 7/2/2019  9:37 AM

## 2019-07-03 LAB — COPPER SERPL-MCNC: 125 UG/DL (ref 72–166)

## 2019-07-03 NOTE — PAYOR COMM NOTE
"7/2 clinical update  [id number]   PHONE    [phone]    Jud Valdes (54 y.o. Female)     Date of Birth Social Security Number Address Home Phone MRN    1965  [address] [place] [id number] [phone] [id number]    Confucianist Marital Status          Other        Admission Date Admission Type Admitting Provider Attending Provider Department, Room/Bed    6/30/19 Emergency Filippo Valdes MD  [facility] 4C, 495/1    Discharge Date Discharge Disposition Discharge Destination        7/2/2019 Hospice/Medical Facility (DC - External)              Attending Provider:  (none)   Allergies:  Avelox [Moxifloxacin Hcl], Codeine, Lortab [Hydrocodone-acetaminophen], Robaxin [Methocarbamol]    Isolation:  None   Infection:  None   Code Status:  Prior    Ht:  160 cm (62.99\")   Wt:  52.6 kg (115 lb 15.4 oz)    Admission Cmt:  None   Principal Problem:  None                Active Insurance as of 6/30/2019     Primary Coverage     Payor Plan Insurance Group Employer/Plan Group    ANTH MEDICAID [facility] MEDICAID [id number]     Payor Plan Address Payor Plan Phone Number Payor Plan Fax Number Effective Dates    [address] [address] [phone]  4/1/2018 - None Entered    [facility] [phone]       Subscriber Name Subscriber Birth Date Member ID       JUD VALDES 1965 [id number]                 Emergency Contacts      (Rel.) Home Phone Work Phone Mobile Phone    Marc Benavidez (Spouse) [phone] -- --    alda Valdes (Son) -- -- [phone]    Alejo Kate (Daughter) -- -- [phone]               Physician Progress Notes (last 72 hours) (Notes from 6/29/2019 10:27 PM through 7/2/2019 10:27 PM)      Filippo Valdes MD at 7/1/2019  3:34 PM                [facility] Medicine Services  INPATIENT PROGRESS NOTE    Patient Name: Jud Valdes  Date of Admission: 6/30/2019  Today's Date: 07/01/19  Length of Stay: " 0  Primary Care Physician: Hernan Ybarra MD    Subjective   Chief Complaint: Pain  HPI   Doing ok.  Has pain all over  Breathing a bit better  Afebrile        Review of Systems   Constitutional: Positive for fatigue. Negative for fever.   HENT: Negative for congestion and ear pain.    Eyes: Negative for pain and visual disturbance.   Respiratory: Positive for cough and shortness of breath. Negative for wheezing.    Cardiovascular: Negative for chest pain and palpitations.   Gastrointestinal: Negative for diarrhea, nausea and vomiting.   Endocrine: Negative for heat intolerance.   Genitourinary: Negative for dysuria and frequency.   Musculoskeletal: Positive for arthralgias and myalgias. Negative for back pain.   Skin: Negative for rash and wound.   Neurological: Positive for weakness. Negative for dizziness and light-headedness.   Psychiatric/Behavioral: Negative for confusion. The patient is not nervous/anxious.    All other systems reviewed and are negative.       All pertinent negatives and positives are as above. All other systems have been reviewed and are negative unless otherwise stated.     Objective    Temp:  [97.4 °F (36.3 °C)-98.2 °F (36.8 °C)] 98.2 °F (36.8 °C)  Heart Rate:  [87-97] 95  Resp:  [16-18] 18  BP: (100-149)/(72-83) 131/72  Physical Exam   Constitutional: She is oriented to person, place, and time. She appears well-developed and well-nourished. Nasal cannula in place.   HENT:   Head: Normocephalic and atraumatic.   Right Ear: External ear normal.   Left Ear: External ear normal.   Nose: Nose normal.   Mouth/Throat: Oropharynx is clear and moist.   Eyes: Conjunctivae and EOM are normal.   Neck: Normal range of motion. Neck supple.   Cardiovascular: Normal rate, regular rhythm and normal heart sounds.   Pulmonary/Chest: Effort normal. She has decreased breath sounds. She has rhonchi.   Abdominal: Soft. Bowel sounds are normal. She exhibits no distension. There is no tenderness.    Musculoskeletal: Normal range of motion.   Neurological: She is alert and oriented to person, place, and time.   Skin: Skin is warm and dry.   Psychiatric: She has a normal mood and affect. Her speech is normal and behavior is normal. Cognition and memory are normal.           Results Review:  I have reviewed the labs, radiology results, and diagnostic studies.    Laboratory Data:   Results from last 7 days   Lab Units 07/01/19  0603 06/30/19  0831   WBC 10*3/mm3 9.59 6.96   HEMOGLOBIN g/dL 9.4* 9.5*   HEMATOCRIT % 29.7* 30.5*   PLATELETS 10*3/mm3 122* 139        Results from last 7 days   Lab Units 07/01/19  0603 06/30/19  0831   SODIUM mmol/L 133* 134*   POTASSIUM mmol/L 3.5 3.7   CHLORIDE mmol/L 99 98   CO2 mmol/L 28.0 29.0   BUN mg/dL 4* 8   CREATININE mg/dL 0.24* 0.35*   CALCIUM mg/dL 7.5* 8.3*   BILIRUBIN mg/dL 0.5 0.7   ALK PHOS U/L 478* 599*   ALT (SGPT) U/L 29 32   AST (SGOT) U/L 55* 73*   GLUCOSE mg/dL 122* 82       Culture Data:   Blood Culture   Date Value Ref Range Status   06/30/2019 No growth at 24 hours  Preliminary   06/30/2019 No growth at 24 hours  Preliminary     Urine Culture   Date Value Ref Range Status   06/30/2019 No growth at 24 hours  Preliminary       Radiology Data:   Imaging Results (last 24 hours)     ** No results found for the last 24 hours. **          I have reviewed the patient's current medications.     Assessment/Plan     Active Hospital Problems    Diagnosis   • Failure to thrive in adult     1.  Wide-spread metastatic cancer  -Hospice consulted    2.  Pneumonia  -Abx to PO    3.  Tobacco abuse  -Cessation counseling    4.  Anxiety/depression  -Home meds    5.  Anemia of chronic Disease  -monitor H&H              Discharge Planning: I expect the patient to be discharged to home with hospice in AM  Filippo Dhillon MD   07/01/19   3:36 PM      Electronically signed by Filippo Dhillon MD at 7/1/2019  3:40 PM     Cherelle Boateng APRN at 7/1/2019  8:02 AM        Plan  "per chart review patient/family agreed to go home with hospice today. Orders placed by Dr. Drew this AM. Discussed with Dr. Dhillon. Will plan to cancel consult. Please re-consult if pateint requirs symptom management needs.    Thanks,   RONAN Byrne  Palliative Care    Electronically signed by Cherelle Boateng APRN at 7/1/2019  8:05 AM     Ricardo Drew MD at 7/1/2019  6:44 AM          [facility] Progress Note    Progress Note    Patient:  Jud Dhillon  YOB: 1965  Date of Service: 7/1/2019  MRN: [id number]   Acct: [id number]   Primary Care Physician: Hernan Ybarra MD  Advance Directive:   Code Status and Medical Interventions:   Ordered at: 07/01/19 0632     Level Of Support Discussed With:    Patient     Code Status:    No CPR     Medical Interventions (Level of Support Prior to Arrest):    Comfort Measures     Admit Date: 6/30/2019       Hospital Day: 0      Subjective:     Chief Compliant: \"Weak.\"  Doing poorly.  Seen with nurse Castro at bedside.        Review of Systems:   Review of Systems   Constitutional: Negative for chills, diaphoresis and fever.   HENT: Negative for mouth sores and nosebleeds.    Eyes: Negative for redness and visual disturbance.   Respiratory: Negative for shortness of breath and wheezing.    Cardiovascular: Negative for chest pain and leg swelling.   Gastrointestinal: Positive for abdominal pain. Negative for nausea and vomiting.   Genitourinary: Negative for flank pain and hematuria.   Neurological: Negative for tremors and speech difficulty.   Hematological: Does not bruise/bleed easily.   Psychiatric/Behavioral: Negative for agitation, behavioral problems, confusion and hallucinations.           Medications:   Scheduled Meds:  aspirin 162 mg Oral Daily   atorvastatin 40 mg Oral Nightly   enoxaparin 30 mg Subcutaneous Q24H   escitalopram 20 mg Oral Nightly   famotidine 20 mg Intravenous BID   guaiFENesin 600 mg Oral Q12H   ipratropium-albuterol " 3 mL Nebulization 4x Daily - RT   levothyroxine 200 mcg Oral Q AM   levothyroxine 25 mcg Oral Q AM   methylPREDNISolone sodium succinate 40 mg Intravenous Q6H   piperacillin-tazobactam 4.5 g Intravenous Q8H   sodium chloride 3 mL Intravenous Q12H   vancomycin 500 mg Intravenous Q12H       Continuous Infusions:  lactated ringers 75 mL/hr Last Rate: 75 mL/hr (06/30/19 2849)       Labs:     Lab Results (last 72 hours)     Procedure Component Value Units Date/Time    Lipase [[id number]]  (Abnormal) Collected:  07/01/19 0603    Specimen:  Blood Updated:  07/01/19 0639     Lipase 1,151 U/L     Copper, Serum [[id number]] Collected:  07/01/19 0603    Specimen:  Blood Updated:  07/01/19 0633    Immunoglobulins A/E/G/M Serum [[id number]] Collected:  07/01/19 0604    Specimen:  Blood Updated:  07/01/19 0633    Beta 2 Microglobulin, Serum [[id number]] Collected:  07/01/19 0604    Specimen:  Blood Updated:  07/01/19 0632    JONATHAN,PE and FLC, Serum [[id number]] Collected:  07/01/19 0604    Specimen:  Blood Updated:  07/01/19 0632    CBC Auto Differential [[id number]]  (Abnormal) Collected:  07/01/19 0603    Specimen:  Blood Updated:  07/01/19 0631     WBC 9.59 10*3/mm3      RBC 3.87 10*6/mm3      Hemoglobin 9.4 g/dL      Hematocrit 29.7 %      MCV 76.7 fL      MCH 24.3 pg      MCHC 31.6 g/dL      RDW 20.8 %      RDW-SD 56.8 fl      MPV -- fL      Comment: Unable to calculate        Platelets 122 10*3/mm3     Comprehensive Metabolic Panel [[id number]] Collected:  07/01/19 0603    Specimen:  Blood Updated:  07/01/19 0629    Lipid Panel [[id number]] Collected:  07/01/19 0603    Specimen:  Blood Updated:  07/01/19 0629    Manual Differential [[id number]] Collected:  07/01/19 0603    Specimen:  Blood Updated:  07/01/19 0628    TSH [[id number]] Collected:  07/01/19 0603    Specimen:  Blood Updated:  07/01/19 0618    Hemoglobin A1c [[id number]] Collected:  07/01/19 0603    Specimen:  Blood Updated:  07/01/19 0618    Vitamin B12 [[id number]]  (Normal)  Collected:  06/30/19 0941    Specimen:  Blood Updated:  06/30/19 1913     Vitamin B-12 503 pg/mL     Folate [[id number]]  (Normal) Collected:  06/30/19 0941    Specimen:  Blood Updated:  06/30/19 1913     Folate 8.39 ng/mL     Ferritin [[id number]]  (Normal) Collected:  06/30/19 0941    Specimen:  Blood Updated:  06/30/19 1842     Ferritin 101.00 ng/mL     Iron Profile [[id number]]  (Abnormal) Collected:  06/30/19 0941    Specimen:  Blood Updated:  06/30/19 1813     Iron 67 mcg/dL      TIBC 224 mcg/dL      Iron Saturation 30 %     Peripheral Blood Smear [[id number]] Collected:  06/30/19 0831    Specimen:  Blood Updated:  06/30/19 1759    Troponin [[id number]]  (Normal) Collected:  06/30/19 1530    Specimen:  Blood Updated:  06/30/19 1622     Troponin I 0.029 ng/mL     Troponin [[id number]]  (Abnormal) Collected:  06/30/19 1132    Specimen:  Blood Updated:  06/30/19 1229     Troponin I 0.037 ng/mL     [place] Draw [[id number]] Collected:  06/30/19 0941    Specimen:  Blood Updated:  06/30/19 1045    Narrative:       The following orders were created for panel order [place] Draw.  Procedure                               Abnormality         Status                     ---------                               -----------         ------                     Red Top[[id number]]                                          Final result                 Please view results for these tests on the individual orders.    Red Top [[id number]] Collected:  06/30/19 0941    Specimen:  Blood Updated:  06/30/19 1045     Extra Tube Hold for add-ons.     Comment: Auto resulted.       Protime-INR [[id number]]  (Normal) Collected:  06/30/19 0941    Specimen:  Blood Updated:  06/30/19 0957     Protime 14.0 Seconds      INR 1.05    aPTT [[id number]]  (Abnormal) Collected:  06/30/19 0941    Specimen:  Blood Updated:  06/30/19 0957     PTT 35.8 seconds     Manual Differential [[id number]]  (Abnormal) Collected:  06/30/19 0831    Specimen:  Blood Updated:   06/30/19 0931     Neutrophil % 74.0 %      Lymphocyte % 13.0 %      Monocyte % 3.0 %      Eosinophil % 4.0 %      Bands %  4.0 %      Metamyelocyte % 2.0 %      Neutrophils Absolute 5.43 10*3/mm3      Lymphocytes Absolute 0.90 10*3/mm3      Monocytes Absolute 0.21 10*3/mm3      Eosinophils Absolute 0.28 10*3/mm3      nRBC 1.0 /100 WBC      Acanthocytes Slight/1+     Anisocytosis Slight/1+     Kaylan Cells Slight/1+     Microcytes Slight/1+     Ovalocytes Slight/1+     Poikilocytes Large/3+     Polychromasia Slight/1+     Schistocytes Slight/1+     WBC Morphology Normal     Giant Platelets Slight/1+    CBC & Differential [[id number]] Collected:  06/30/19 0831    Specimen:  Blood Updated:  06/30/19 0931    Narrative:       The following orders were created for panel order CBC & Differential.  Procedure                               Abnormality         Status                     ---------                               -----------         ------                     CBC Auto Differential[[id number]]        Abnormal            Final result                 Please view results for these tests on the individual orders.    CBC Auto Differential [[id number]]  (Abnormal) Collected:  06/30/19 0831    Specimen:  Blood Updated:  06/30/19 0931     WBC 6.96 10*3/mm3      RBC 3.95 10*6/mm3      Hemoglobin 9.5 g/dL      Hematocrit 30.5 %      MCV 77.2 fL      MCH 24.1 pg      MCHC 31.1 g/dL      RDW 20.9 %      RDW-SD 57.4 fl      MPV 10.1 fL      Platelets 139 10*3/mm3     Narrative:       ckd    Blood Culture - Blood, Arm, Left [[id number]] Collected:  06/30/19 0829    Specimen:  Blood from Arm, Left Updated:  06/30/19 0928    Blood Culture - Blood, Arm, Right [[id number]] Collected:  06/30/19 0840    Specimen:  Blood from Arm, Right Updated:  06/30/19 0928    Urinalysis With Culture If Indicated - Urine, Catheter In/Out [[id number]]  (Abnormal) Collected:  06/30/19 0847    Specimen:  Urine, Catheter In/Out Updated:  06/30/19 0917      Color, UA Dark Yellow     Appearance, UA Clear     pH, UA 6.0     Specific Gravity, UA 1.023     Glucose, UA Negative     Ketones, UA 80 mg/dL (3+)     Bilirubin, UA Moderate (2+)     Blood, UA Negative     Protein, UA 30 mg/dL (1+)     Leuk Esterase, UA Negative     Nitrite, UA Negative     Urobilinogen, UA 2.0 E.U./dL    Urinalysis, Microscopic Only - Urine, Catheter In/Out [[id number]]  (Abnormal) Collected:  06/30/19 0847    Specimen:  Urine, Catheter In/Out Updated:  06/30/19 0917     RBC, UA None Seen /HPF      WBC, UA 0-2 /HPF      Bacteria, UA None Seen /HPF      Squamous Epithelial Cells, UA 0-2 /HPF      Hyaline Casts, UA None Seen /LPF      Methodology Manual Light Microscopy    Blood Gas, Arterial [[id number]]  (Abnormal) Collected:  06/30/19 0905    Specimen:  Arterial Blood Updated:  06/30/19 0916     Site Left Brachial     [place]'s Test N/A     pH, Arterial 7.434 pH units      pCO2, Arterial 40.3 mm Hg      pO2, Arterial 79.9 mm Hg      Comment: 84 Value below reference range        HCO3, Arterial 27.0 mmol/L      Comment: 83 Value above reference range        Base Excess, Arterial 2.5 mmol/L      Comment: 83 Value above reference range        O2 Saturation, Arterial 94.4 %      Temperature 37.0 C      Barometric Pressure for Blood Gas 753 mmHg      Modality Room Air     Flow Rate 2.0 lpm      Ventilator Mode NA     Collected by [id number]     Comment: Meter: [id number]     :  [id number]       Procalcitonin [[id number]]  (Abnormal) Collected:  06/30/19 0831    Specimen:  Blood Updated:  06/30/19 0908     Procalcitonin 0.59 ng/mL     Narrative:       SIRS, sepsis, severe sepsis, and septic shock are categorized according to the criteria of the consensus conference of the American College of Chest Physicians/Society of Critical Care Medicine.    PCT < 0.5 ng/mL     Systemic infection (sepsis) is not likely.    PCT >0.5 and < 2.0 ng/mL Systemic infection (sepsis) is possible, but other conditions are  known to elevate PCT as well.    PCT > 2.0 ng/mL     Systemic infection (sepsis) is likely, unless other causes are known.      PCT > 10.0 ng/mL    Important systemic inflammatory response, almost exclusively due to severe bacterial sepsis or septic shock.    PCT values of < 0.5 ng/mL do not exclude an infection, because localized infections (without systemic signs) may be associated with such low concentrations, or a systemic infection in its initial stages (<6 hours).  Increased PCT can occur without infection.  PCT concentrations between 0.5 and 2.0 ng/mL should be interpreted taking into account the patients history.  It is recommended to retest PCT within 6-24 hours if any concentrations < 2.0 ng/mL are obtained.    Ammonia [[id number]]  (Normal) Collected:  06/30/19 0831    Specimen:  Blood Updated:  06/30/19 0904     Ammonia 13 umol/L     Troponin [[id number]]  (Abnormal) Collected:  06/30/19 0831    Specimen:  Blood Updated:  06/30/19 0902     Troponin I 0.046 ng/mL     BNP [[id number]]  (Abnormal) Collected:  06/30/19 0831    Specimen:  Blood Updated:  06/30/19 0902     proBNP 2,580.0 pg/mL     Urine Culture - Urine, Urine, Catheter In/Out [[id number]] Collected:  06/30/19 0847    Specimen:  Urine, Catheter In/Out Updated:  06/30/19 0853    Lactic Acid, Plasma [[id number]]  (Normal) Collected:  06/30/19 0831    Specimen:  Blood Updated:  06/30/19 0851     Lactate 1.2 mmol/L     Lipase [[id number]]  (Abnormal) Collected:  06/30/19 0831    Specimen:  Blood Updated:  06/30/19 0851     Lipase 762 U/L     Amylase [[id number]]  (Abnormal) Collected:  06/30/19 0831    Specimen:  Blood Updated:  06/30/19 0851     Amylase 147 U/L     Comprehensive Metabolic Panel [[id number]]  (Abnormal) Collected:  06/30/19 0831    Specimen:  Blood Updated:  06/30/19 0851     Glucose 82 mg/dL      BUN 8 mg/dL      Creatinine 0.35 mg/dL      Sodium 134 mmol/L      Potassium 3.7 mmol/L      Chloride 98 mmol/L      CO2 29.0 mmol/L       Calcium 8.3 mg/dL      Total Protein 6.1 g/dL      Albumin 3.10 g/dL      ALT (SGPT) 32 U/L      AST (SGOT) 73 U/L      Alkaline Phosphatase 599 U/L      Total Bilirubin 0.7 mg/dL      eGFR Non African Amer >150 mL/min/1.73      Globulin 3.0 gm/dL      A/G Ratio 1.0 g/dL      BUN/Creatinine Ratio 22.9     Anion Gap 7.0 mmol/L     Narrative:       GFR Normal >60  Chronic Kidney Disease <60  Kidney Failure <15            Radiology:     Imaging Results (last 72 hours)     Procedure Component Value Units Date/Time    XR Abdomen 2 View With Chest 1 View [[id number]] Collected:  06/30/19 0828     Updated:  06/30/19 0834    Narrative:       EXAMINATION: XR ABDOMEN 2 VW W CHEST 1 VW- 6/30/2019 8:28 AM CDT     HISTORY: Chest pain.     REPORT: A frontal view the chest was obtained as well as upright and  supine views of the abdomen. Comparison is made with the chest x-ray  05/07/2008, KUB 05/01/2008.     There is dense consolidation of the right upper lobe compatible with  acute pneumonia. There are coarse interstitial markings diffusely. There  is patchy infiltrate in the left lateral midlung zone. No pneumothorax  or pleural effusion is identified. Heart size is normal. There is  ectasia of the thoracic aorta. These of the abdomen demonstrate multiple  surgical clips in the right abdomen, no free air is identified. The  bowel gas pattern is nonspecific. The osseous structures show no acute  findings. There are no suspicious intra-abdominal calcifications.       Impression:       1. Consolidating infiltrate in the right upper lobe and patchy  infiltrate in the left midlung zone compatible with acute pneumonia.  Follow-up is recommended until the infiltrates resolve to exclude  underlying neoplasm. Chronic interstitial disease.  2. Nonspecific bowel gas pattern with no convincing evidence of  obstruction. No free air. Extensive previous right-sided abdominal  surgery.  This report was finalized on 06/30/2019 08:31 by Dr. Portillo  "MD Rosalia.            Objective:   Vitals: /76 (BP Location: Left arm, Patient Position: Lying)   Pulse 94   Temp 97.6 °F (36.4 °C) (Oral)   Resp 18   Ht 160 cm (62.99\")   Wt 52.6 kg (115 lb 15.4 oz)   SpO2 98%   BMI 20.55 kg/m²    Physical Exam   Constitutional: She is oriented to person, place, and time.   Chronically ill looking.   HENT:   Head: Normocephalic and atraumatic.   Eyes: No scleral icterus.   Neck: No JVD present.   Cardiovascular: Normal rate and regular rhythm.   Pulmonary/Chest: No respiratory distress. She has no wheezes.   Abdominal: Soft. Bowel sounds are normal. There is tenderness. There is no rebound and no guarding.   Musculoskeletal: She exhibits no edema.   Neurological: She is alert and oriented to person, place, and time.   Skin: There is pallor.   Psychiatric: She has a normal mood and affect. Her behavior is normal. Judgment and thought content normal.   Nursing note and vitals reviewed.    24HR INTAKE/OUTPUT:      Intake/Output Summary (Last 24 hours) at 7/1/2019 0644  Last data filed at 7/1/2019 0608  Gross per 24 hour   Intake 2982.4 ml   Output 200 ml   Net 2782.4 ml        Problem list:       Failure to thrive in adult      Assessment/Plan:     1. Multiple liver metastases, etiology undefined.  History of thyroid cancer and carcinoid tumor, small bowel, gallbladder, large intestine, and appendix.    2. Poor performance status of 3.   3. Pneumonia.   4. Tobacco abuse  5. Anxiety  6. Depression.  7. Microcytic anemia.     PLAN:  1.   Re:  Poor overall prognosis and too ill for palliative chemotherapy.  2.   Re:  No liver biopsy due to her poor overall prognosis.  3.   Re:  Supportive care/hospice and code status.  She agreed for hospice and comfort care.  \"I am so weak.  I was DNR when I came here.  Thank you.\"    4.   No office visit.  Will sign off.    5.   Above discussed with nurse Castro.  She verbalized understanding.       Electronically " signed by Ricardo Drew MD at 7/1/2019  6:53 AM       Consult Notes (last 48 hours) (Notes from 6/30/2019 10:27 PM through 7/2/2019 10:27 PM)     No notes of this type exist for this encounter.

## 2019-07-05 ENCOUNTER — TELEPHONE (OUTPATIENT)
Dept: CASE MANAGEMENT | Age: 54
End: 2019-07-05

## 2019-07-05 LAB
BACTERIA SPEC AEROBE CULT: NORMAL
BACTERIA SPEC AEROBE CULT: NORMAL
IGA SERPL-MCNC: 271 MG/DL (ref 87–352)
IGG SERPL-MCNC: 461 MG/DL (ref 700–1600)
IGM SERPL-MCNC: 237 MG/DL (ref 26–217)
TOTAL IGE SMQN RAST: 37 IU/ML (ref 6–495)

## 2019-07-05 NOTE — TELEPHONE ENCOUNTER
Dane, Hospice RN Case Manger - notified of new orders per Dr. Sharan Doherty. Thank you.   Electronically signed by Brandon Chavez RN on 7/5/2019 at 2:24 PM
